# Patient Record
Sex: FEMALE | Race: OTHER | HISPANIC OR LATINO | URBAN - METROPOLITAN AREA
[De-identification: names, ages, dates, MRNs, and addresses within clinical notes are randomized per-mention and may not be internally consistent; named-entity substitution may affect disease eponyms.]

---

## 2019-12-31 ENCOUNTER — EMERGENCY (EMERGENCY)
Facility: HOSPITAL | Age: 44
LOS: 1 days | Discharge: HOME | End: 2019-12-31
Attending: EMERGENCY MEDICINE | Admitting: EMERGENCY MEDICINE
Payer: MEDICARE

## 2019-12-31 VITALS
DIASTOLIC BLOOD PRESSURE: 96 MMHG | OXYGEN SATURATION: 97 % | SYSTOLIC BLOOD PRESSURE: 174 MMHG | RESPIRATION RATE: 16 BRPM | TEMPERATURE: 99 F | WEIGHT: 145.95 LBS | HEART RATE: 96 BPM

## 2019-12-31 VITALS
TEMPERATURE: 98 F | OXYGEN SATURATION: 96 % | RESPIRATION RATE: 16 BRPM | DIASTOLIC BLOOD PRESSURE: 83 MMHG | HEART RATE: 94 BPM | SYSTOLIC BLOOD PRESSURE: 145 MMHG

## 2019-12-31 DIAGNOSIS — J90 PLEURAL EFFUSION, NOT ELSEWHERE CLASSIFIED: ICD-10-CM

## 2019-12-31 LAB
ALBUMIN SERPL ELPH-MCNC: 4.4 G/DL — SIGNIFICANT CHANGE UP (ref 3.3–5)
ALP SERPL-CCNC: SIGNIFICANT CHANGE UP U/L (ref 40–120)
ALT FLD-CCNC: SIGNIFICANT CHANGE UP U/L (ref 10–45)
ANION GAP SERPL CALC-SCNC: 14 MMOL/L — SIGNIFICANT CHANGE UP (ref 5–17)
APTT BLD: 30.9 SEC — SIGNIFICANT CHANGE UP (ref 27.5–36.3)
AST SERPL-CCNC: SIGNIFICANT CHANGE UP U/L (ref 10–40)
BASOPHILS # BLD AUTO: 0.02 K/UL — SIGNIFICANT CHANGE UP (ref 0–0.2)
BASOPHILS NFR BLD AUTO: 0.4 % — SIGNIFICANT CHANGE UP (ref 0–2)
BILIRUB SERPL-MCNC: 0.8 MG/DL — SIGNIFICANT CHANGE UP (ref 0.2–1.2)
BUN SERPL-MCNC: 10 MG/DL — SIGNIFICANT CHANGE UP (ref 7–23)
CALCIUM SERPL-MCNC: 8.7 MG/DL — SIGNIFICANT CHANGE UP (ref 8.4–10.5)
CHLORIDE SERPL-SCNC: 103 MMOL/L — SIGNIFICANT CHANGE UP (ref 96–108)
CO2 SERPL-SCNC: 23 MMOL/L — SIGNIFICANT CHANGE UP (ref 22–31)
CREAT SERPL-MCNC: 0.51 MG/DL — SIGNIFICANT CHANGE UP (ref 0.5–1.3)
EOSINOPHIL # BLD AUTO: 0.01 K/UL — SIGNIFICANT CHANGE UP (ref 0–0.5)
EOSINOPHIL NFR BLD AUTO: 0.2 % — SIGNIFICANT CHANGE UP (ref 0–6)
GLUCOSE SERPL-MCNC: 103 MG/DL — HIGH (ref 70–99)
HCT VFR BLD CALC: 37.2 % — SIGNIFICANT CHANGE UP (ref 34.5–45)
HGB BLD-MCNC: 12.4 G/DL — SIGNIFICANT CHANGE UP (ref 11.5–15.5)
IMM GRANULOCYTES NFR BLD AUTO: 0.2 % — SIGNIFICANT CHANGE UP (ref 0–1.5)
INR BLD: 1.1 — SIGNIFICANT CHANGE UP (ref 0.88–1.16)
LYMPHOCYTES # BLD AUTO: 1.07 K/UL — SIGNIFICANT CHANGE UP (ref 1–3.3)
LYMPHOCYTES # BLD AUTO: 23.9 % — SIGNIFICANT CHANGE UP (ref 13–44)
MCHC RBC-ENTMCNC: 30.5 PG — SIGNIFICANT CHANGE UP (ref 27–34)
MCHC RBC-ENTMCNC: 33.3 GM/DL — SIGNIFICANT CHANGE UP (ref 32–36)
MCV RBC AUTO: 91.4 FL — SIGNIFICANT CHANGE UP (ref 80–100)
MONOCYTES # BLD AUTO: 0.3 K/UL — SIGNIFICANT CHANGE UP (ref 0–0.9)
MONOCYTES NFR BLD AUTO: 6.7 % — SIGNIFICANT CHANGE UP (ref 2–14)
NEUTROPHILS # BLD AUTO: 3.06 K/UL — SIGNIFICANT CHANGE UP (ref 1.8–7.4)
NEUTROPHILS NFR BLD AUTO: 68.6 % — SIGNIFICANT CHANGE UP (ref 43–77)
NRBC # BLD: 0 /100 WBCS — SIGNIFICANT CHANGE UP (ref 0–0)
PLATELET # BLD AUTO: 310 K/UL — SIGNIFICANT CHANGE UP (ref 150–400)
POTASSIUM SERPL-MCNC: SIGNIFICANT CHANGE UP MMOL/L (ref 3.5–5.3)
POTASSIUM SERPL-SCNC: SIGNIFICANT CHANGE UP MMOL/L (ref 3.5–5.3)
PROT SERPL-MCNC: 7.5 G/DL — SIGNIFICANT CHANGE UP (ref 6–8.3)
PROTHROM AB SERPL-ACNC: 12.5 SEC — SIGNIFICANT CHANGE UP (ref 10–12.9)
RBC # BLD: 4.07 M/UL — SIGNIFICANT CHANGE UP (ref 3.8–5.2)
RBC # FLD: 16.4 % — HIGH (ref 10.3–14.5)
SODIUM SERPL-SCNC: 140 MMOL/L — SIGNIFICANT CHANGE UP (ref 135–145)
WBC # BLD: 4.47 K/UL — SIGNIFICANT CHANGE UP (ref 3.8–10.5)
WBC # FLD AUTO: 4.47 K/UL — SIGNIFICANT CHANGE UP (ref 3.8–10.5)

## 2019-12-31 PROCEDURE — 96375 TX/PRO/DX INJ NEW DRUG ADDON: CPT

## 2019-12-31 PROCEDURE — 80053 COMPREHEN METABOLIC PANEL: CPT

## 2019-12-31 PROCEDURE — 96376 TX/PRO/DX INJ SAME DRUG ADON: CPT

## 2019-12-31 PROCEDURE — 71045 X-RAY EXAM CHEST 1 VIEW: CPT

## 2019-12-31 PROCEDURE — 85610 PROTHROMBIN TIME: CPT

## 2019-12-31 PROCEDURE — 71045 X-RAY EXAM CHEST 1 VIEW: CPT | Mod: 26

## 2019-12-31 PROCEDURE — 99284 EMERGENCY DEPT VISIT MOD MDM: CPT

## 2019-12-31 PROCEDURE — 85025 COMPLETE CBC W/AUTO DIFF WBC: CPT

## 2019-12-31 PROCEDURE — 96374 THER/PROPH/DIAG INJ IV PUSH: CPT

## 2019-12-31 PROCEDURE — 36415 COLL VENOUS BLD VENIPUNCTURE: CPT

## 2019-12-31 PROCEDURE — 85730 THROMBOPLASTIN TIME PARTIAL: CPT

## 2019-12-31 PROCEDURE — 99284 EMERGENCY DEPT VISIT MOD MDM: CPT | Mod: 25

## 2019-12-31 RX ORDER — HYDROMORPHONE HYDROCHLORIDE 2 MG/ML
1 INJECTION INTRAMUSCULAR; INTRAVENOUS; SUBCUTANEOUS
Qty: 30 | Refills: 0
Start: 2019-12-31 | End: 2020-01-04

## 2019-12-31 RX ORDER — SODIUM CHLORIDE 9 MG/ML
1000 INJECTION INTRAMUSCULAR; INTRAVENOUS; SUBCUTANEOUS ONCE
Refills: 0 | Status: COMPLETED | OUTPATIENT
Start: 2019-12-31 | End: 2019-12-31

## 2019-12-31 RX ORDER — ONDANSETRON 8 MG/1
4 TABLET, FILM COATED ORAL ONCE
Refills: 0 | Status: COMPLETED | OUTPATIENT
Start: 2019-12-31 | End: 2019-12-31

## 2019-12-31 RX ORDER — HYDROMORPHONE HYDROCHLORIDE 2 MG/ML
1 INJECTION INTRAMUSCULAR; INTRAVENOUS; SUBCUTANEOUS ONCE
Refills: 0 | Status: DISCONTINUED | OUTPATIENT
Start: 2019-12-31 | End: 2019-12-31

## 2019-12-31 RX ORDER — DOCUSATE SODIUM 100 MG
1 CAPSULE ORAL
Qty: 20 | Refills: 0
Start: 2019-12-31 | End: 2020-01-09

## 2019-12-31 RX ADMIN — Medication 50 MILLIGRAM(S): at 21:36

## 2019-12-31 RX ADMIN — HYDROMORPHONE HYDROCHLORIDE 1 MILLIGRAM(S): 2 INJECTION INTRAMUSCULAR; INTRAVENOUS; SUBCUTANEOUS at 19:50

## 2019-12-31 RX ADMIN — HYDROMORPHONE HYDROCHLORIDE 1 MILLIGRAM(S): 2 INJECTION INTRAMUSCULAR; INTRAVENOUS; SUBCUTANEOUS at 21:36

## 2019-12-31 RX ADMIN — HYDROMORPHONE HYDROCHLORIDE 1 MILLIGRAM(S): 2 INJECTION INTRAMUSCULAR; INTRAVENOUS; SUBCUTANEOUS at 19:35

## 2019-12-31 RX ADMIN — ONDANSETRON 4 MILLIGRAM(S): 8 TABLET, FILM COATED ORAL at 19:51

## 2019-12-31 RX ADMIN — SODIUM CHLORIDE 1000 MILLILITER(S): 9 INJECTION INTRAMUSCULAR; INTRAVENOUS; SUBCUTANEOUS at 19:37

## 2019-12-31 RX ADMIN — SODIUM CHLORIDE 1000 MILLILITER(S): 9 INJECTION INTRAMUSCULAR; INTRAVENOUS; SUBCUTANEOUS at 20:00

## 2019-12-31 NOTE — ED PROVIDER NOTE - PATIENT PORTAL LINK FT
You can access the FollowMyHealth Patient Portal offered by [facility] by registering at the following website: [email or web address]. By joining [employer]’s FollowMyHealth portal, you will also be able to view your health information using other applications (apps) compatible with our system.

## 2019-12-31 NOTE — ED PROVIDER NOTE - OBJECTIVE STATEMENT
45 y/o F PMHx breast ca mets to lungs currently on chemo presents to the ED w/ R chest pain since 2am today. Went to [facility] at 4am and was told of a 56H wait for admission so her daughter who works at [facility] [employer] brought her here. A few months ago there was an infection in her lungs with no drainage done, but 3 Y ago she had a surgery where her right lung was drained. Pt denies HTN, DM, smoking, SOB, difficulty breathing. She has no PCP because she is from [place]. Pt had breast ca 10 Y ago w/ mets to her lungs 4 Y ago. Results from the [facility] CT chest with contrast shows no PE but post treatment change in the right lung w/ possible Residual/ recurrent tumor. Multiple sclerotic foci in the T spine which may represent osseous metastatic disease.

## 2019-12-31 NOTE — CONSULT NOTE ADULT - SUBJECTIVE AND OBJECTIVE BOX
PULMONARY SERVICE INITIAL CONSULT NOTE    HPI:  45yo F w/ h/o breast CA which reportedly spread to the right lung s/p RUL lobectomy about 3-4 years ago on CTX who presents with sudden onset right sided CP that began in early AM hours today. Patient receives most of her care in [place] and is in [place] visiting her daughter - has oncologist and pulmonologist back home in [place]. Patient went to [facility] earlier this AM because the pain woke her from sleep. She had CTA chest which ruled out PE (per CT report) and showed post-surgical changes and small right effusion. She left AMA from [facility] because it was taking too long to have her admitted to the hospital (per records - for IV pain control). Patient says she has always had a small amount of water on the right lung since her surgery and her pulmonologist knew about it. She has had it tapped many years ago but otherwise it has been stable and never caused her problems. She denies infectious sx, cough, CP, palpitations, or flu-like sx. No sick contacts. No SOB or breathing difficulties even when she has the pain. Pain is improved by narcotic analgesics and not related to breathing.     REVIEW OF SYSTEMS:  Constitutional: No fever, weight loss or fatigue  Eyes: No eye pain, visual disturbances, or discharge  ENMT:  No difficulty hearing, tinnitus, vertigo; No sinus or throat pain  Neck: No pain, stiffness or neck swelling  Respiratory: see HPI  Cardiovascular: No chest pain, palpitations, dizziness or leg swelling  Gastrointestinal: No abdominal or epigastric pain. No nausea, vomiting or hematemesis; No diarrhea or constipation. No melena or hematochezia.  Genitourinary: No dysuria, frequency, hematuria or incontinence  Neurological: No headaches, memory loss, loss of strength, numbness or tremors  Skin: No itching, burning, rashes or lesions   Lymph Nodes: No enlarged glands  Endocrine: No heat or cold intolerance; No hair loss  Musculoskeletal: No joint pain or swelling; No muscle, back or extremity pain  Psychiatric: No depression, anxiety, mood swings or difficulty sleeping  Heme/Lymph: No easy bruising or bleeding gums  Allergy and Immunologic: No hives or eczema    PAST MEDICAL & SURGICAL HISTORY:  Breast cancer metastasized to lung    FAMILY HISTORY:    SOCIAL HISTORY:  Smoking Status: [ ] Current, [ ] Former, [ ] Never  Pack Years:    MEDICATIONS:  Pulmonary:    Antimicrobials:    Anticoagulants:    Onc:    GI/:    Endocrine:    Cardiac:    Other Medications:      Allergies    No Known Allergies    Intolerances        Vital Signs Last 24 Hrs  T(C): 36.7 (31 Dec 2019 21:26), Max: 37.1 (31 Dec 2019 18:16)  T(F): 98.1 (31 Dec 2019 21:26), Max: 98.7 (31 Dec 2019 18:16)  HR: 94 (31 Dec 2019 21:26) (94 - 96)  BP: 145/83 (31 Dec 2019 21:26) (145/83 - 174/96)  BP(mean): --  RR: 16 (31 Dec 2019 21:26) (16 - 16)  SpO2: 96% (31 Dec 2019 21:26) (96% - 97%)        PHYSICAL EXAM:  Constitutional: thin woman resting comfortably in bed; NAD  Head: NC/AT  EENT: PERRL, anicteric sclera; oropharynx clear, MMM  Neck: supple, no appreciable JVD  Respiratory: diminished BS in RUL; good overall inspiratory effort, non-labored breathing and otherwise CTA  Chest: reproducible tenderness overlying the right lateral 6th and 7th ribs and interspace and old surgical scar below  Cardiovascular: +S1/S2, RRR  Gastrointestinal: soft, NT/ND; +BSx4  Extremities: WWP; no edema, clubbing or cyanosis  Vascular: 2+ radial, DP/PT pulses B/L  Neurological: awake and alert; OCONNELL    LABS:  CBC Full  -  ( 31 Dec 2019 18:51 )  WBC Count : 4.47 K/uL  RBC Count : 4.07 M/uL  Hemoglobin : 12.4 g/dL  Hematocrit : 37.2 %  Platelet Count - Automated : 310 K/uL  Mean Cell Volume : 91.4 fl  Mean Cell Hemoglobin : 30.5 pg  Mean Cell Hemoglobin Concentration : 33.3 gm/dL  Auto Neutrophil # : 3.06 K/uL  Auto Lymphocyte # : 1.07 K/uL  Auto Monocyte # : 0.30 K/uL  Auto Eosinophil # : 0.01 K/uL  Auto Basophil # : 0.02 K/uL  Auto Neutrophil % : 68.6 %  Auto Lymphocyte % : 23.9 %  Auto Monocyte % : 6.7 %  Auto Eosinophil % : 0.2 %  Auto Basophil % : 0.4 %    12-31    140  |  103  |  10  ----------------------------<  103<H>  see note   |  23  |  0.51    Ca    8.7      31 Dec 2019 18:52    TPro  7.5  /  Alb  4.4  /  TBili  0.8  /  DBili  x   /  AST  see note  /  ALT  see note  /  AlkPhos  see note  12-31    PT/INR - ( 31 Dec 2019 18:51 )   PT: 12.5 sec;   INR: 1.10          PTT - ( 31 Dec 2019 18:51 )  PTT:30.9 sec    RADIOLOGY & ADDITIONAL STUDIES:  Reviewed CXR personally.    Reviewed outside hospital CTA chest PE protocol paper report (images were unavailable for review) - per report, no PE, post-surgical changes and related volume loss in RUL s/p lobectomy; areas of osseous foci in thoracic spine that were concerning for possible metastases.

## 2019-12-31 NOTE — CONSULT NOTE ADULT - ATTENDING COMMENTS
I discussed the case over the phone with the pulmonary fellow and reviewed data and imaging. I agree with his assessment and plan but did not personally see or examine the pt.

## 2019-12-31 NOTE — ED ADULT NURSE NOTE - OBJECTIVE STATEMENT
Patient presents to the ED c/o right sided chest pain that started around 2am.  Patient states she was in [facility] ED and they wanted to admit her but told her she would be waiting for multiple days for a bed.  Denies fever, chills.  Does report SOB.  AA&OX3, respirations unlabored, non-diaphoretic, no pallor.

## 2019-12-31 NOTE — ED PROVIDER NOTE - CLINICAL SUMMARY MEDICAL DECISION MAKING FREE TEXT BOX
px feeling much better- seen by pulm- afebrile no infectious sx wbc neg- - doubt infectious cause- likely malignant effusion- will rx with pain meds steroids, fu w heme/onc urgently

## 2019-12-31 NOTE — ED PROVIDER NOTE - NSFOLLOWUPINSTRUCTIONS_ED_ALL_ED_FT
Pleural Effusion  Pleural effusion is an abnormal buildup of fluid in the layers of tissue between the lungs and the inside of the chest (pleural space) The two layers of tissue that line the lungs and the inside of the chest are called pleura. Usually, there is no air in the space between the pleura, only a thin layer of fluid. Some conditions can cause a large amount of fluid to build up, which can cause the lung to collapse if untreated. A pleural effusion is usually caused by another disease that requires treatment.  What are the causes?  Pleural effusion can be caused by:  Heart failure.Certain infections, such as pneumonia or tuberculosis.Cancer.A blood clot in the lung (pulmonary embolism).Complications from surgery, such as from open heart surgery.Liver disease (cirrhosis).Kidney disease.What are the signs or symptoms?  In some cases, pleural effusion may cause no symptoms. If symptoms are present, they may include:  Shortness of breath, especially when lying down.Chest pain. This may get worse when taking a deep breath.Fever.Dry, long-lasting (chronic) cough.Hiccups.Rapid breathing.An underlying condition that is causing the pleural effusion (such as heart failure, pneumonia, blood clots, tuberculosis, or cancer) may also cause other symptoms.  How is this diagnosed?  This condition may be diagnosed based on:  Your symptoms and medical history.A physical exam.A chest X-ray.A procedure to use a needle to remove fluid from the pleural space (thoracentesis). This fluid is tested.Other imaging studies of the chest, such as ultrasound or CT scan.How is this treated?  Depending on the cause of your condition, treatment may include:  Treating the underlying condition that is causing the effusion. When that condition improves, the effusion will also improve. Examples of treatment for underlying conditions include:  Antibiotic medicines to treat an infection.Diuretics or other heart medicines to treat heart failure.Thoracentesis.Placing a thin flexible tube under your skin and into your chest to continuously drain the effusion (indwelling pleural catheter).Surgery to remove the outer layer of tissue from the pleural space (decortication).A procedure to put medicine into the chest cavity to seal the pleural space and prevent fluid buildup (pleurodesis).Chemotherapy and radiation therapy, if you have cancerous (malignant) pleural effusion. These treatments are typically used to treat cancer. They kill certain cells in the body.Follow these instructions at home:  Take over-the-counter and prescription medicines only as told by your health care provider.Ask your health care provider what activities are safe for you.Keep track of how long you are able to do mild exercise (such as walking) before you get short of breath. Write down this information to share with your health care provider. Your ability to exercise should improve over time.Do not use any products that contain nicotine or tobacco, such as cigarettes and e-cigarettes. If you need help quitting, ask your health care provider.Keep all follow-up visits as told by your health care provider. This is important.Contact a health care provider if:  The amount of time that you are able to do mild exercise:  Decreases.Does not improve with time.You have a fever.Get help right away if:  You are short of breath.You develop chest pain.You develop a new cough.Summary  Pleural effusion is an abnormal buildup of fluid in the layers of tissue between the lungs and the inside of the chest.Pleural effusion can have many causes, including heart failure, pulmonary embolism, infections, or cancer.Symptoms of pleural effusion can include shortness of breath, chest pain, fever, long-lasting (chronic) cough, hiccups, or rapid breathing.Diagnosis often involves making images of the chest (such as with ultrasound or X-ray) and removing fluid (thoracentesis) to send for testing.Treatment for pleural effusion depends on what underlying condition is causing it.This information is not intended to replace advice given to you by your health care provider. Make sure you discuss any questions you have with your health care provider.    Document Released: 12/18/2006 Document Revised: 08/23/2018 Document Reviewed: 08/23/2018  [employer] Interactive Patient Education © 2019 [employer]

## 2019-12-31 NOTE — CONSULT NOTE ADULT - PROBLEM SELECTOR RECOMMENDATION 9
Point of care bedside US performed by myself reveals trace right sided simple appearing effusion at the base -- consistent with patient's given history of chronic post-surgical effusion. She is asymptomatic from respiratory point of view, has stable vital signs, and normal labs making infection very less likely. While pleurisy is possible, her pain seems less consistent with pleurodynia and may be representative of costo-chondritic pain, perhaps due to new metastases from her known malignancy. No signs of splinting on exam.     Recommendations:  - would continue with analgesics and symptomatic care for her pain  - consider medrol dose pack as additional analgesic tx in setting of likely bony metastases  - not indicated for thoracentesis at this time  - discussed at length with patient and her daughter at bedside -- she is returning to [place] on Thursday (1/2/20) and can follow-up with her community physicians on an urgent basis when she returns. She is agreeable and in favor of doing so as long as her pain can be managed with oral medications upon discharge.     Discussed w/ pulm attending Dr. Mcdonald and ED attending Dr. Wei

## 2019-12-31 NOTE — ED ADULT TRIAGE NOTE - CHIEF COMPLAINT QUOTE
R sided chest pain @ site of partial lobectomy 4 years ago, [facility] and [facility] told there was a lot of fluid in her R lung.  Visiting daughter from [place], hx of breast ca w mets

## 2019-12-31 NOTE — CONSULT NOTE ADULT - ASSESSMENT
43yo F w/ PMH as above presenting with sudden onset reproducible chest pain on the right, found at outside hospital on CTA to have small pleural effusion but no evidence of PE.

## 2019-12-31 NOTE — ED ADULT NURSE NOTE - CHIEF COMPLAINT QUOTE
R sided chest pain @ site of partial lobectomy 4 years ago, [facility] there was a lot of fluid in her R lung.  Visiting daughter from [place], hx of breast ca w mets

## 2020-01-05 DIAGNOSIS — J90 PLEURAL EFFUSION, NOT ELSEWHERE CLASSIFIED: ICD-10-CM

## 2020-01-05 DIAGNOSIS — R07.89 OTHER CHEST PAIN: ICD-10-CM

## 2020-10-23 ENCOUNTER — INPATIENT (INPATIENT)
Facility: HOSPITAL | Age: 45
LOS: 2 days | Discharge: HOME | DRG: 186 | End: 2020-10-26
Attending: STUDENT IN AN ORGANIZED HEALTH CARE EDUCATION/TRAINING PROGRAM | Admitting: STUDENT IN AN ORGANIZED HEALTH CARE EDUCATION/TRAINING PROGRAM
Payer: MEDICARE

## 2020-10-23 VITALS
WEIGHT: 128.97 LBS | DIASTOLIC BLOOD PRESSURE: 134 MMHG | SYSTOLIC BLOOD PRESSURE: 190 MMHG | TEMPERATURE: 98 F | OXYGEN SATURATION: 95 % | HEART RATE: 118 BPM | RESPIRATION RATE: 20 BRPM | HEIGHT: 64 IN

## 2020-10-23 DIAGNOSIS — I10 ESSENTIAL (PRIMARY) HYPERTENSION: ICD-10-CM

## 2020-10-23 DIAGNOSIS — R63.8 OTHER SYMPTOMS AND SIGNS CONCERNING FOOD AND FLUID INTAKE: ICD-10-CM

## 2020-10-23 DIAGNOSIS — Z98.890 OTHER SPECIFIED POSTPROCEDURAL STATES: Chronic | ICD-10-CM

## 2020-10-23 DIAGNOSIS — R06.02 SHORTNESS OF BREATH: ICD-10-CM

## 2020-10-23 DIAGNOSIS — C50.919 MALIGNANT NEOPLASM OF UNSPECIFIED SITE OF UNSPECIFIED FEMALE BREAST: ICD-10-CM

## 2020-10-23 DIAGNOSIS — R52 PAIN, UNSPECIFIED: ICD-10-CM

## 2020-10-23 DIAGNOSIS — Z90.11 ACQUIRED ABSENCE OF RIGHT BREAST AND NIPPLE: Chronic | ICD-10-CM

## 2020-10-23 DIAGNOSIS — J90 PLEURAL EFFUSION, NOT ELSEWHERE CLASSIFIED: ICD-10-CM

## 2020-10-23 DIAGNOSIS — Z29.9 ENCOUNTER FOR PROPHYLACTIC MEASURES, UNSPECIFIED: ICD-10-CM

## 2020-10-23 LAB
ALBUMIN FLD-MCNC: 2.1 G/DL — SIGNIFICANT CHANGE UP
ALBUMIN SERPL ELPH-MCNC: 3.9 G/DL — SIGNIFICANT CHANGE UP (ref 3.3–5)
ALBUMIN SERPL ELPH-MCNC: 4.7 G/DL — SIGNIFICANT CHANGE UP (ref 3.3–5)
ALP SERPL-CCNC: 116 U/L — SIGNIFICANT CHANGE UP (ref 40–120)
ALP SERPL-CCNC: 92 U/L — SIGNIFICANT CHANGE UP (ref 40–120)
ALT FLD-CCNC: 17 U/L — SIGNIFICANT CHANGE UP (ref 10–45)
ALT FLD-CCNC: SIGNIFICANT CHANGE UP (ref 10–45)
ANION GAP SERPL CALC-SCNC: 11 MMOL/L — SIGNIFICANT CHANGE UP (ref 5–17)
ANION GAP SERPL CALC-SCNC: 13 MMOL/L — SIGNIFICANT CHANGE UP (ref 5–17)
APPEARANCE UR: CLEAR — SIGNIFICANT CHANGE UP
APTT BLD: 33.9 SEC — SIGNIFICANT CHANGE UP (ref 27.5–35.5)
AST SERPL-CCNC: 31 U/L — SIGNIFICANT CHANGE UP (ref 10–40)
AST SERPL-CCNC: SIGNIFICANT CHANGE UP (ref 10–40)
BASOPHILS # BLD AUTO: 0.03 K/UL — SIGNIFICANT CHANGE UP (ref 0–0.2)
BASOPHILS NFR BLD AUTO: 0.4 % — SIGNIFICANT CHANGE UP (ref 0–2)
BILIRUB SERPL-MCNC: 0.4 MG/DL — SIGNIFICANT CHANGE UP (ref 0.2–1.2)
BILIRUB SERPL-MCNC: 0.7 MG/DL — SIGNIFICANT CHANGE UP (ref 0.2–1.2)
BILIRUB UR-MCNC: NEGATIVE — SIGNIFICANT CHANGE UP
BUN SERPL-MCNC: 14 MG/DL — SIGNIFICANT CHANGE UP (ref 7–23)
BUN SERPL-MCNC: 15 MG/DL — SIGNIFICANT CHANGE UP (ref 7–23)
CALCIUM SERPL-MCNC: 8.4 MG/DL — SIGNIFICANT CHANGE UP (ref 8.4–10.5)
CALCIUM SERPL-MCNC: 9.6 MG/DL — SIGNIFICANT CHANGE UP (ref 8.4–10.5)
CHLORIDE SERPL-SCNC: 103 MMOL/L — SIGNIFICANT CHANGE UP (ref 96–108)
CHLORIDE SERPL-SCNC: 105 MMOL/L — SIGNIFICANT CHANGE UP (ref 96–108)
CO2 SERPL-SCNC: 22 MMOL/L — SIGNIFICANT CHANGE UP (ref 22–31)
CO2 SERPL-SCNC: 24 MMOL/L — SIGNIFICANT CHANGE UP (ref 22–31)
COLOR SPEC: YELLOW — SIGNIFICANT CHANGE UP
CREAT FLD-MCNC: 0.62 MG/DL — SIGNIFICANT CHANGE UP
CREAT SERPL-MCNC: 0.58 MG/DL — SIGNIFICANT CHANGE UP (ref 0.5–1.3)
CREAT SERPL-MCNC: 0.61 MG/DL — SIGNIFICANT CHANGE UP (ref 0.5–1.3)
CRP SERPL-MCNC: 0.71 MG/DL — HIGH (ref 0–0.4)
D DIMER BLD IA.RAPID-MCNC: 688 NG/ML DDU — HIGH
DIFF PNL FLD: NEGATIVE — SIGNIFICANT CHANGE UP
EOSINOPHIL # BLD AUTO: 0.01 K/UL — SIGNIFICANT CHANGE UP (ref 0–0.5)
EOSINOPHIL NFR BLD AUTO: 0.1 % — SIGNIFICANT CHANGE UP (ref 0–6)
FERRITIN SERPL-MCNC: 79 NG/ML — SIGNIFICANT CHANGE UP (ref 15–150)
GLUCOSE FLD-MCNC: 112 MG/DL — SIGNIFICANT CHANGE UP
GLUCOSE SERPL-MCNC: 107 MG/DL — HIGH (ref 70–99)
GLUCOSE SERPL-MCNC: 154 MG/DL — HIGH (ref 70–99)
GLUCOSE UR QL: NEGATIVE — SIGNIFICANT CHANGE UP
GRAM STN FLD: SIGNIFICANT CHANGE UP
HCT VFR BLD CALC: 40.3 % — SIGNIFICANT CHANGE UP (ref 34.5–45)
HGB BLD-MCNC: 14.1 G/DL — SIGNIFICANT CHANGE UP (ref 11.5–15.5)
IMM GRANULOCYTES NFR BLD AUTO: 0.3 % — SIGNIFICANT CHANGE UP (ref 0–1.5)
INR BLD: 1.02 — SIGNIFICANT CHANGE UP (ref 0.88–1.16)
KETONES UR-MCNC: NEGATIVE — SIGNIFICANT CHANGE UP
LDH SERPL L TO P-CCNC: 283 U/L — HIGH (ref 50–242)
LDH SERPL L TO P-CCNC: 95 U/L — SIGNIFICANT CHANGE UP
LEUKOCYTE ESTERASE UR-ACNC: ABNORMAL
LYMPHOCYTES # BLD AUTO: 1.34 K/UL — SIGNIFICANT CHANGE UP (ref 1–3.3)
LYMPHOCYTES # BLD AUTO: 19.6 % — SIGNIFICANT CHANGE UP (ref 13–44)
MCHC RBC-ENTMCNC: 32.1 PG — SIGNIFICANT CHANGE UP (ref 27–34)
MCHC RBC-ENTMCNC: 35 GM/DL — SIGNIFICANT CHANGE UP (ref 32–36)
MCV RBC AUTO: 91.8 FL — SIGNIFICANT CHANGE UP (ref 80–100)
MONOCYTES # BLD AUTO: 0.36 K/UL — SIGNIFICANT CHANGE UP (ref 0–0.9)
MONOCYTES NFR BLD AUTO: 5.3 % — SIGNIFICANT CHANGE UP (ref 2–14)
NEUTROPHILS # BLD AUTO: 5.08 K/UL — SIGNIFICANT CHANGE UP (ref 1.8–7.4)
NEUTROPHILS NFR BLD AUTO: 74.3 % — SIGNIFICANT CHANGE UP (ref 43–77)
NITRITE UR-MCNC: NEGATIVE — SIGNIFICANT CHANGE UP
NRBC # BLD: 0 /100 WBCS — SIGNIFICANT CHANGE UP (ref 0–0)
NT-PROBNP SERPL-SCNC: 2756 PG/ML — HIGH (ref 0–300)
PH UR: 7 — SIGNIFICANT CHANGE UP (ref 5–8)
PH, PLEURAL FLUID: 7.46 — SIGNIFICANT CHANGE UP
PLATELET # BLD AUTO: 230 K/UL — SIGNIFICANT CHANGE UP (ref 150–400)
POTASSIUM SERPL-MCNC: 4.4 MMOL/L — SIGNIFICANT CHANGE UP (ref 3.5–5.3)
POTASSIUM SERPL-MCNC: SIGNIFICANT CHANGE UP (ref 3.5–5.3)
POTASSIUM SERPL-SCNC: 4.4 MMOL/L — SIGNIFICANT CHANGE UP (ref 3.5–5.3)
POTASSIUM SERPL-SCNC: SIGNIFICANT CHANGE UP (ref 3.5–5.3)
PROCALCITONIN SERPL-MCNC: 0.06 NG/ML — SIGNIFICANT CHANGE UP (ref 0.02–0.1)
PROT FLD-MCNC: 2.8 G/DL — SIGNIFICANT CHANGE UP
PROT SERPL-MCNC: 6.8 G/DL — SIGNIFICANT CHANGE UP (ref 6–8.3)
PROT SERPL-MCNC: 8.4 G/DL — HIGH (ref 6–8.3)
PROT UR-MCNC: NEGATIVE MG/DL — SIGNIFICANT CHANGE UP
PROTHROM AB SERPL-ACNC: 12.2 SEC — SIGNIFICANT CHANGE UP (ref 10.6–13.6)
RBC # BLD: 4.39 M/UL — SIGNIFICANT CHANGE UP (ref 3.8–5.2)
RBC # FLD: 14.1 % — SIGNIFICANT CHANGE UP (ref 10.3–14.5)
SARS-COV-2 RNA SPEC QL NAA+PROBE: SIGNIFICANT CHANGE UP
SODIUM SERPL-SCNC: 138 MMOL/L — SIGNIFICANT CHANGE UP (ref 135–145)
SODIUM SERPL-SCNC: 140 MMOL/L — SIGNIFICANT CHANGE UP (ref 135–145)
SP GR SPEC: 1.01 — SIGNIFICANT CHANGE UP (ref 1–1.03)
SPECIMEN SOURCE FLD: SIGNIFICANT CHANGE UP
SPECIMEN SOURCE: SIGNIFICANT CHANGE UP
TROPONIN T SERPL-MCNC: <0.01 NG/ML — SIGNIFICANT CHANGE UP (ref 0–0.01)
TROPONIN T SERPL-MCNC: <0.01 NG/ML — SIGNIFICANT CHANGE UP (ref 0–0.01)
UROBILINOGEN FLD QL: 0.2 E.U./DL — SIGNIFICANT CHANGE UP
WBC # BLD: 6.84 K/UL — SIGNIFICANT CHANGE UP (ref 3.8–10.5)
WBC # FLD AUTO: 6.84 K/UL — SIGNIFICANT CHANGE UP (ref 3.8–10.5)

## 2020-10-23 PROCEDURE — 32555 ASPIRATE PLEURA W/ IMAGING: CPT

## 2020-10-23 PROCEDURE — 71045 X-RAY EXAM CHEST 1 VIEW: CPT | Mod: 26,77

## 2020-10-23 PROCEDURE — 71045 X-RAY EXAM CHEST 1 VIEW: CPT | Mod: 26

## 2020-10-23 PROCEDURE — 99285 EMERGENCY DEPT VISIT HI MDM: CPT | Mod: 25

## 2020-10-23 PROCEDURE — 99254 IP/OBS CNSLTJ NEW/EST MOD 60: CPT | Mod: GC,25

## 2020-10-23 PROCEDURE — 71275 CT ANGIOGRAPHY CHEST: CPT | Mod: 26

## 2020-10-23 PROCEDURE — 93010 ELECTROCARDIOGRAM REPORT: CPT

## 2020-10-23 RX ORDER — ONDANSETRON 8 MG/1
4 TABLET, FILM COATED ORAL ONCE
Refills: 0 | Status: COMPLETED | OUTPATIENT
Start: 2020-10-23 | End: 2020-10-23

## 2020-10-23 RX ORDER — HYDROMORPHONE HYDROCHLORIDE 2 MG/ML
1 INJECTION INTRAMUSCULAR; INTRAVENOUS; SUBCUTANEOUS
Qty: 0 | Refills: 0 | DISCHARGE

## 2020-10-23 RX ORDER — TIZANIDINE 4 MG/1
2 TABLET ORAL EVERY 8 HOURS
Refills: 0 | Status: DISCONTINUED | OUTPATIENT
Start: 2020-10-23 | End: 2020-10-26

## 2020-10-23 RX ORDER — ONDANSETRON 8 MG/1
4 TABLET, FILM COATED ORAL EVERY 12 HOURS
Refills: 0 | Status: DISCONTINUED | OUTPATIENT
Start: 2020-10-23 | End: 2020-10-26

## 2020-10-23 RX ORDER — MORPHINE SULFATE 50 MG/1
4 CAPSULE, EXTENDED RELEASE ORAL ONCE
Refills: 0 | Status: DISCONTINUED | OUTPATIENT
Start: 2020-10-23 | End: 2020-10-23

## 2020-10-23 RX ORDER — HYDROMORPHONE HYDROCHLORIDE 2 MG/ML
2 INJECTION INTRAMUSCULAR; INTRAVENOUS; SUBCUTANEOUS EVERY 6 HOURS
Refills: 0 | Status: DISCONTINUED | OUTPATIENT
Start: 2020-10-23 | End: 2020-10-23

## 2020-10-23 RX ORDER — HYDROMORPHONE HYDROCHLORIDE 2 MG/ML
0.5 INJECTION INTRAMUSCULAR; INTRAVENOUS; SUBCUTANEOUS EVERY 6 HOURS
Refills: 0 | Status: DISCONTINUED | OUTPATIENT
Start: 2020-10-23 | End: 2020-10-25

## 2020-10-23 RX ORDER — TIZANIDINE 4 MG/1
1 TABLET ORAL
Qty: 0 | Refills: 0 | DISCHARGE

## 2020-10-23 RX ORDER — KETOROLAC TROMETHAMINE 30 MG/ML
15 SYRINGE (ML) INJECTION ONCE
Refills: 0 | Status: DISCONTINUED | OUTPATIENT
Start: 2020-10-23 | End: 2020-10-23

## 2020-10-23 RX ORDER — CEFTRIAXONE 500 MG/1
1000 INJECTION, POWDER, FOR SOLUTION INTRAMUSCULAR; INTRAVENOUS ONCE
Refills: 0 | Status: COMPLETED | OUTPATIENT
Start: 2020-10-23 | End: 2020-10-23

## 2020-10-23 RX ORDER — LOSARTAN POTASSIUM 100 MG/1
1 TABLET, FILM COATED ORAL
Qty: 0 | Refills: 0 | DISCHARGE

## 2020-10-23 RX ORDER — ENOXAPARIN SODIUM 100 MG/ML
40 INJECTION SUBCUTANEOUS EVERY 24 HOURS
Refills: 0 | Status: DISCONTINUED | OUTPATIENT
Start: 2020-10-23 | End: 2020-10-26

## 2020-10-23 RX ORDER — TIZANIDINE 4 MG/1
2 TABLET ORAL
Qty: 0 | Refills: 0 | DISCHARGE

## 2020-10-23 RX ORDER — GABAPENTIN 400 MG/1
1 CAPSULE ORAL
Qty: 0 | Refills: 0 | DISCHARGE

## 2020-10-23 RX ORDER — AZITHROMYCIN 500 MG/1
500 TABLET, FILM COATED ORAL ONCE
Refills: 0 | Status: COMPLETED | OUTPATIENT
Start: 2020-10-23 | End: 2020-10-23

## 2020-10-23 RX ORDER — LABETALOL HCL 100 MG
10 TABLET ORAL ONCE
Refills: 0 | Status: COMPLETED | OUTPATIENT
Start: 2020-10-23 | End: 2020-10-23

## 2020-10-23 RX ORDER — LOSARTAN POTASSIUM 100 MG/1
25 TABLET, FILM COATED ORAL DAILY
Refills: 0 | Status: DISCONTINUED | OUTPATIENT
Start: 2020-10-23 | End: 2020-10-26

## 2020-10-23 RX ORDER — GABAPENTIN 400 MG/1
300 CAPSULE ORAL THREE TIMES A DAY
Refills: 0 | Status: DISCONTINUED | OUTPATIENT
Start: 2020-10-23 | End: 2020-10-26

## 2020-10-23 RX ORDER — FENTANYL CITRATE 50 UG/ML
1 INJECTION INTRAVENOUS
Refills: 0 | Status: DISCONTINUED | OUTPATIENT
Start: 2020-10-23 | End: 2020-10-26

## 2020-10-23 RX ADMIN — Medication 10 MILLIGRAM(S): at 12:26

## 2020-10-23 RX ADMIN — Medication 15 MILLIGRAM(S): at 11:22

## 2020-10-23 RX ADMIN — MORPHINE SULFATE 4 MILLIGRAM(S): 50 CAPSULE, EXTENDED RELEASE ORAL at 12:48

## 2020-10-23 RX ADMIN — MORPHINE SULFATE 4 MILLIGRAM(S): 50 CAPSULE, EXTENDED RELEASE ORAL at 13:30

## 2020-10-23 RX ADMIN — Medication 15 MILLIGRAM(S): at 12:00

## 2020-10-23 RX ADMIN — CEFTRIAXONE 100 MILLIGRAM(S): 500 INJECTION, POWDER, FOR SOLUTION INTRAMUSCULAR; INTRAVENOUS at 12:03

## 2020-10-23 RX ADMIN — HYDROMORPHONE HYDROCHLORIDE 0.5 MILLIGRAM(S): 2 INJECTION INTRAMUSCULAR; INTRAVENOUS; SUBCUTANEOUS at 21:42

## 2020-10-23 RX ADMIN — AZITHROMYCIN 500 MILLIGRAM(S): 500 TABLET, FILM COATED ORAL at 13:30

## 2020-10-23 RX ADMIN — AZITHROMYCIN 255 MILLIGRAM(S): 500 TABLET, FILM COATED ORAL at 12:26

## 2020-10-23 RX ADMIN — HYDROMORPHONE HYDROCHLORIDE 2 MILLIGRAM(S): 2 INJECTION INTRAMUSCULAR; INTRAVENOUS; SUBCUTANEOUS at 19:52

## 2020-10-23 RX ADMIN — CEFTRIAXONE 1000 MILLIGRAM(S): 500 INJECTION, POWDER, FOR SOLUTION INTRAMUSCULAR; INTRAVENOUS at 12:35

## 2020-10-23 RX ADMIN — MORPHINE SULFATE 4 MILLIGRAM(S): 50 CAPSULE, EXTENDED RELEASE ORAL at 16:44

## 2020-10-23 RX ADMIN — ENOXAPARIN SODIUM 40 MILLIGRAM(S): 100 INJECTION SUBCUTANEOUS at 21:12

## 2020-10-23 RX ADMIN — HYDROMORPHONE HYDROCHLORIDE 0.5 MILLIGRAM(S): 2 INJECTION INTRAMUSCULAR; INTRAVENOUS; SUBCUTANEOUS at 21:12

## 2020-10-23 RX ADMIN — ONDANSETRON 4 MILLIGRAM(S): 8 TABLET, FILM COATED ORAL at 21:20

## 2020-10-23 RX ADMIN — ONDANSETRON 4 MILLIGRAM(S): 8 TABLET, FILM COATED ORAL at 13:02

## 2020-10-23 NOTE — PROCEDURE NOTE - NSPROCDETAILS_GEN_ALL_CORE
Seldinger technique/connection to syringe/ultrasound assessment of effusion (localization)/location identified, draped/prepped, sterile technique used, needle inserted/introduced/catheter inserted over needle

## 2020-10-23 NOTE — CONSULT NOTE ADULT - SUBJECTIVE AND OBJECTIVE BOX
PULMONARY SERVICE INITIAL CONSULT NOTE  -------------------------------------------------------------    Chief Complaint/Reason for Consult:     HPI:         Otherwise, pt denies fevers/chills, HA, dizziness, CP, SOB, cough, palpitations, abd pain, N/V, bowel changes, ext swelling     -------------------------------------------------------------  PAST MEDICAL & SURGICAL HISTORY:  Breast cancer metastasized to lung        FAMILY HISTORY:      SOCIAL HISTORY:  Smoking Status: [ ] Current, [ ] Former, [ ] Never  Pack Years:    MEDICATIONS:  Pulmonary:    Antimicrobials:    Anticoagulants:    Onc:    GI/:    Endocrine:    Cardiac:    Other Medications:      Allergies    No Known Allergies    Intolerances        Vital Signs Last 24 Hrs  T(C): 36.6 (23 Oct 2020 14:46), Max: 36.8 (23 Oct 2020 09:38)  T(F): 97.9 (23 Oct 2020 14:46), Max: 98.2 (23 Oct 2020 09:38)  HR: 94 (23 Oct 2020 14:46) (79 - 118)  BP: 135/84 (23 Oct 2020 14:46) (134/82 - 190/134)  BP(mean): --  RR: 18 (23 Oct 2020 14:46) (18 - 20)  SpO2: 95% (23 Oct 2020 14:46) (95% - 98%)        -------------------------------------------------------------  PHYSICAL EXAM:    GEN: Comfortable, in NAD  HEENT: NC/AT, PEERLA, MMM  CARDIAC: RRR, Normal S1/S2, No MRGs  PULMONARY: CTA BL, no wheezing/rhonchi/rales - no accessory muscle use   ABDOMEN: Soft, NT/ND   EXT: No LE edema  NEURO: A&O x 3, CN II-XII grossly intact, moves all ext, sensation intact    -------------------------------------------------------------  LABS:        CBC Full  -  ( 23 Oct 2020 11:24 )  WBC Count : 6.84 K/uL  RBC Count : 4.39 M/uL  Hemoglobin : 14.1 g/dL  Hematocrit : 40.3 %  Platelet Count - Automated : 230 K/uL  Mean Cell Volume : 91.8 fl  Mean Cell Hemoglobin : 32.1 pg  Mean Cell Hemoglobin Concentration : 35.0 gm/dL  Auto Neutrophil # : 5.08 K/uL  Auto Lymphocyte # : 1.34 K/uL  Auto Monocyte # : 0.36 K/uL  Auto Eosinophil # : 0.01 K/uL  Auto Basophil # : 0.03 K/uL  Auto Neutrophil % : 74.3 %  Auto Lymphocyte % : 19.6 %  Auto Monocyte % : 5.3 %  Auto Eosinophil % : 0.1 %  Auto Basophil % : 0.4 %    10-23    138  |  105  |  15  ----------------------------<  154<H>  4.4   |  22  |  0.58    Ca    8.4      23 Oct 2020 13:06    TPro  6.8  /  Alb  3.9  /  TBili  0.4  /  DBili  x   /  AST  31  /  ALT  17  /  AlkPhos  92  10-23    PT/INR - ( 23 Oct 2020 11:24 )   PT: 12.2 sec;   INR: 1.02          PTT - ( 23 Oct 2020 11:24 )  PTT:33.9 sec      Urinalysis Basic - ( 23 Oct 2020 11:07 )    Color: Yellow / Appearance: Clear / S.010 / pH: x  Gluc: x / Ketone: NEGATIVE  / Bili: Negative / Urobili: 0.2 E.U./dL   Blood: x / Protein: NEGATIVE mg/dL / Nitrite: NEGATIVE   Leuk Esterase: Moderate / RBC: < 5 /HPF / WBC > 10 /HPF   Sq Epi: x / Non Sq Epi: 5-10 /HPF / Bacteria: Present /HPF                -------------------------------------------------------------  RADIOLOGY & ADDITIONAL STUDIES:   PULMONARY SERVICE INITIAL CONSULT NOTE  -------------------------------------------------------------    HPI:     46yo F, hx of Breast Ca (R-sided, dx ~, on chemo, s/p RT, with mets to R lung s/p removal), HTN, visiting from [place],   presenting with several days of worsening SOB and midsternal chest pressure,   Found with moderate-sized L-sided pleural effusion     --    Pt diagnosed with R-sided breast cancer ~, underwent radiation therapy, and is currently receiving chemotherapy (unknown name). It had mets to a portion of the R lung, and underwent surgical removal ~2017. She has had prior episodes of pleural effusions, requiring thoracentesis, but were always Right-Sided. She receives all her care in [place], and is currently visiting her daughter here in [place].     She reports 3-days of worsening DUMONT --> SOB at rest occasionally, associated with midsternal chest pressure, sometimes with wheezing. She has no hx of prior symptoms.     ROS:  Denies fevers/chills, HA, dizziness, cough, abd pain, N/V, bowel changes, ext swelling, sick contacts     In ED, VSS - afebrile - no leukocytosis - mildly elevated D-Dimer - Pro-BNP 3000, negative troponin   CTA with moderate-sized Left-sided pleural effusion     --    Pt seen and examined this PM in ED.   [place] a little improved with supplemental O2, otherwise in NAD.   Never smoker, with no hx of environmental/toxin exposure.  No hx of cardiopulmonary disease, trauma/falls, or hx of left-sided pleural effusions     --    Bedside POCUS in ED:  - Moderate-sized simple-appearing left-sided pleural effusion   - No effusion seen on Right   - Pathological bilateral B-lines (suggestive of pulmonary edema)   - Cardiac with questionably-reduced contractility of LV; no pericardial effusion seen       -------------------------------------------------------------  PAST MEDICAL & SURGICAL HISTORY:  Breast cancer metastasized to lung  HTN        FAMILY HISTORY:      SOCIAL HISTORY:  Smoking Status: [ ] Current, [ ] Former, [X] Never  Pack Years:    MEDICATIONS:  Pulmonary:    Antimicrobials:    Anticoagulants:    Onc:    GI/:    Endocrine:    Cardiac:    Other Medications:      Allergies    No Known Allergies    Intolerances        Vital Signs Last 24 Hrs  T(C): 36.6 (23 Oct 2020 14:46), Max: 36.8 (23 Oct 2020 09:38)  T(F): 97.9 (23 Oct 2020 14:46), Max: 98.2 (23 Oct 2020 09:38)  HR: 94 (23 Oct 2020 14:46) (79 - 118)  BP: 135/84 (23 Oct 2020 14:46) (134/82 - 190/134)  BP(mean): --  RR: 18 (23 Oct 2020 14:46) (18 - 20)  SpO2: 95% (23 Oct 2020 14:46) (95% - 98%)        -------------------------------------------------------------  PHYSICAL EXAM:    GEN: Comfortable, in NAD  HEENT: NC/AT, PEERLA, MMM  CARDIAC: RRR, Normal S1/S2, No MRGs  PULMONARY: Decreased BS L lower-hemithorax; no wheezing/rhonchi/rales - no accessory muscle use   ABDOMEN: Soft, NT/ND   EXT: No LE edema  NEURO: A&O x 3, CN II-XII grossly intact, moves all ext, sensation intact    -------------------------------------------------------------  LABS:        CBC Full  -  ( 23 Oct 2020 11:24 )  WBC Count : 6.84 K/uL  RBC Count : 4.39 M/uL  Hemoglobin : 14.1 g/dL  Hematocrit : 40.3 %  Platelet Count - Automated : 230 K/uL  Mean Cell Volume : 91.8 fl  Mean Cell Hemoglobin : 32.1 pg  Mean Cell Hemoglobin Concentration : 35.0 gm/dL  Auto Neutrophil # : 5.08 K/uL  Auto Lymphocyte # : 1.34 K/uL  Auto Monocyte # : 0.36 K/uL  Auto Eosinophil # : 0.01 K/uL  Auto Basophil # : 0.03 K/uL  Auto Neutrophil % : 74.3 %  Auto Lymphocyte % : 19.6 %  Auto Monocyte % : 5.3 %  Auto Eosinophil % : 0.1 %  Auto Basophil % : 0.4 %    10-23    138  |  105  |  15  ----------------------------<  154<H>  4.4   |  22  |  0.58    Ca    8.4      23 Oct 2020 13:06    TPro  6.8  /  Alb  3.9  /  TBili  0.4  /  DBili  x   /  AST  31  /  ALT  17  /  AlkPhos  92  10-23    PT/INR - ( 23 Oct 2020 11:24 )   PT: 12.2 sec;   INR: 1.02          PTT - ( 23 Oct 2020 11:24 )  PTT:33.9 sec      Urinalysis Basic - ( 23 Oct 2020 11:07 )    Color: Yellow / Appearance: Clear / S.010 / pH: x  Gluc: x / Ketone: NEGATIVE  / Bili: Negative / Urobili: 0.2 E.U./dL   Blood: x / Protein: NEGATIVE mg/dL / Nitrite: NEGATIVE   Leuk Esterase: Moderate / RBC: < 5 /HPF / WBC > 10 /HPF   Sq Epi: x / Non Sq Epi: 5-10 /HPF / Bacteria: Present /HPF                -------------------------------------------------------------  RADIOLOGY & ADDITIONAL STUDIES:

## 2020-10-23 NOTE — ED PROVIDER NOTE - LAB INTERPRETATION
ER Physician: June Ree  CHEST XRAY INTERPRETATION: ? b/l infiltrates, surgical clips, heart shadow normal, bony structures intact

## 2020-10-23 NOTE — H&P ADULT - PROBLEM SELECTOR PLAN 2
Pt found to have large L sided pleural effusion and small R effusion on CT Chest Angio PE. Pleural effusion likely exudative 2/2 malignancy vs transudative.  - pulmonology consulted ; bedside thoracentesis with 1000mL drained.   - f/u fluid studies to assess for Light's criteria and cytology   - currently on RA in NAD Pt found to have large L sided pleural effusion and small R effusion on CT Chest Angio PE. Pleural effusion likely exudative 2/2 malignancy vs transudative.  - pulmonology consulted ; bedside thoracentesis with 1000mL drained.   - f/u fluid studies to assess for Light's criteria and cytology   - currently on RA in NAD  - repeat CXR in AM Patient found to have large L sided pleural effusion and small R effusion on CT Chest Angio PE. Pleural effusion likely transudative given Light's criteria: fluid LDH and protein 95& 2.8. Serum LDH and protein: 283 & 6.8. Patient with elevated BNP and bedside POCUS showing decreased contractility.  - pulmonology consulted ; bedside thoracentesis with 1000mL drained.   - f/u fluid studies for cytology   - currently on RA in NAD  - repeat CXR in AM  - f/u echo

## 2020-10-23 NOTE — ED PROVIDER NOTE - CARE PLAN
Principal Discharge DX:	Pleural effusion  Secondary Diagnosis:	Malignant neoplasm of lung, unspecified laterality, unspecified part of lung

## 2020-10-23 NOTE — H&P ADULT - PROBLEM SELECTOR PLAN 3
Patient follows with oncologist Buster Bui in [place] where she lives. Per patient last chemo was on October 3rd and next session is scheduled for October 29th, and she had been told that she was improving by her oncologist.  - outpatient follow-up with her oncologist

## 2020-10-23 NOTE — ED ADULT TRIAGE NOTE - OTHER COMPLAINTS
Patient also reports chest pain. Daughter reports patient recently traveled from [place] 11 days ago. Denies any fever.

## 2020-10-23 NOTE — H&P ADULT - PROBLEM SELECTOR PLAN 4
Pt with hx of HTN , BP on admission elevated to 190s. s/p Labetalol 10mg IV push   - c/w with home Pt with hx of HTN , BP on admission elevated to 190s. s/p Labetalol 10mg IV push. On losartan 25 at home.   - c/w losartan 25

## 2020-10-23 NOTE — ED PROVIDER NOTE - OBJECTIVE STATEMENT
44 y/o F with PMHx of HTN, R breast CA s/p mastectomy's 10 years ago and metastasize to the R lung 3 years ago s/o surgery, was on chemo- last chemo 3 weeks ago p/w 3 days of SOB, initially mild now worsening and progressive on exertion. Pt also c/o R chest pain x1 day described as sharp and worse when pressing on the area and taking in deep breaths. Occasional cough x1 day. No fever, chills, N/V, melena, abd pain, urinary sxs, myalgia. Recent travel to [place] and returned to [facility] on Oct 10th. 46 y/o F with PMHx of HTN, R breast CA s/p mastectomy 10 years ago, with metastasis to R lung 3 years ago s/p surgery, was on chemo- last chemo 3 weeks ago, p/w 3 days of SOB, initially mild now worsening and progressive on exertion and lying supine. Pt also c/o R chest pain x1 day described as sharp and worse when pressing on the area and taking in deep breaths. Occasional cough x1 day. No fever, chills, N/V, melena, abd pain, urinary sxs, myalgia. Recent travel from [place]. denies any leg pain/ swelling.

## 2020-10-23 NOTE — ED ADULT NURSE NOTE - OBJECTIVE STATEMENT
Pt reports recent travel to [place], reports chest pain, sob and "cough because I'm short of breath." Pt denies chills, fever, reports last chemo tx for lung cancer 2 weeks ago and last chemo on 10/29. Pt reports hx of breast cancer, cannot use right arm.

## 2020-10-23 NOTE — H&P ADULT - NSHPLABSRESULTS_GEN_ALL_CORE
.  LABS:                         14.1   6.84  )-----------( 230      ( 23 Oct 2020 11:24 )             40.3     10    138  |  105  |  15  ----------------------------<  154<H>  4.4   |  22  |  0.58    Ca    8.4      23 Oct 2020 13:06    TPro  6.8  /  Alb  3.9  /  TBili  0.4  /  DBili  x   /  AST  31  /  ALT  17  /  AlkPhos  92  10-23    PT/INR - ( 23 Oct 2020 11:24 )   PT: 12.2 sec;   INR: 1.02          PTT - ( 23 Oct 2020 11:24 )  PTT:33.9 sec  Urinalysis Basic - ( 23 Oct 2020 11:07 )    Color: Yellow / Appearance: Clear / S.010 / pH: x  Gluc: x / Ketone: NEGATIVE  / Bili: Negative / Urobili: 0.2 E.U./dL   Blood: x / Protein: NEGATIVE mg/dL / Nitrite: NEGATIVE   Leuk Esterase: Moderate / RBC: < 5 /HPF / WBC > 10 /HPF   Sq Epi: x / Non Sq Epi: 5-10 /HPF / Bacteria: Present /HPF      CARDIAC MARKERS ( 23 Oct 2020 16:49 )  x     / <0.01 ng/mL / x     / x     / x      CARDIAC MARKERS ( 23 Oct 2020 11:24 )  x     / <0.01 ng/mL / x     / x     / x          Serum Pro-Brain Natriuretic Peptide: 2756 pg/mL (10-23 @ 11:24)        RADIOLOGY, EKG & ADDITIONAL TESTS: Reviewed.

## 2020-10-23 NOTE — CONSULT NOTE ADULT - ASSESSMENT
46yo F, hx of Breast Ca (R-sided, dx ~2010, on chemo, s/p RT, with mets to R lung s/p removal), HTN, visiting from [place],   presenting with several days of worsening SOB and midsternal chest pressure,   Found with moderate-sized L-sided pleural effusion     #SOB/DUMONT - Concerning for new-onset heart failure in the setting of elevated BNP and pleural effusion, and pathological bilateral B-lines seen on bedside POCUS. Trop neg x 2, low concern for ACS.   #Pleural Effusion - Unilateral (L); moderate-sized and simple-appearing on bedside POCUS. High suspicion for malignancy given unilateral, hx of breast cancer with mets, and hx of prior pleural effusion (although always R-sided). Effusion 2/2 to heart-failure is a consideration, given new-onset SOB/DUMONT, elevated BNP, and possibility of R-sided pleural intervention (pleurodesis?), which would cause a unilateral effusion in this case. Low concern for infectious etiology.       - s/p Left-sided thoracentesis in ED on 10/23 with removal of 1000cc blood-tinged fluid  - f/u fluid studies and cytology  - Obtain formal TTE (as concern for new-onset HF, and bedside POCUS showing ?reduced contractility)   - O2 via NC PRN for goal 93-97%  - DuoNebs q6 PRN for SOB/wheezing

## 2020-10-23 NOTE — H&P ADULT - NSHPPHYSICALEXAM_GEN_ALL_CORE
.  VITAL SIGNS:  T(C): 36.6 (10-23-20 @ 14:46), Max: 36.8 (10-23-20 @ 09:38)  T(F): 97.9 (10-23-20 @ 14:46), Max: 98.2 (10-23-20 @ 09:38)  HR: 94 (10-23-20 @ 14:46) (79 - 118)  BP: 135/84 (10-23-20 @ 14:46) (134/82 - 190/134)  BP(mean): --  RR: 18 (10-23-20 @ 14:46) (18 - 20)  SpO2: 95% (10-23-20 @ 14:46) (95% - 98%)  Wt(kg): --    PHYSICAL EXAM:    Constitutional: WDWN resting comfortably in bed; NAD  Head: NC/AT  Eyes: PERRL, EOMI, anicteric sclera  ENT: no nasal discharge; uvula midline, no oropharyngeal erythema or exudates; MMM  Neck: supple; no JVD or thyromegaly  Respiratory: CTA B/L; no W/R/R, no retractions  Cardiac: +S1/S2; RRR; no M/R/G; PMI non-displaced  Gastrointestinal: abdomen soft, NT/ND; no rebound or guarding; +BSx4  Genitourinary: normal external genitalia  Back: spine midline, no bony tenderness or step-offs; no CVAT B/L  Extremities: WWP, no clubbing or cyanosis; no peripheral edema  Musculoskeletal: NROM x4; no joint swelling, tenderness or erythema  Vascular: 2+ radial, femoral, DP/PT pulses B/L  Dermatologic: skin warm, dry and intact; no rashes, wounds, or scars  Lymphatic: no submandibular or cervical LAD  Neurologic: AAOx3; CNII-XII grossly intact; no focal deficits  Psychiatric: affect and characteristics of appearance, verbalizations, behaviors are appropriate

## 2020-10-23 NOTE — H&P ADULT - PROBLEM SELECTOR PLAN 1
Pt presents with 3 days of worsening SOB. Due to pt's hx of malignancy, travel, tachycardia, and elevated d-dimer CT Angio Chest was ordered which ruled out pulmonary embolism. Imagining did demonstrate large L sided pleural effusion and small R effusion. SOB likely 2/2 to pleural effusion and less likely 2/2 cardiac and pulmonary embolism ruled out.   - pt currently not using any accessory muscles or in acute distress   - management of pleural effusion as below
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
## 2020-10-23 NOTE — CONSULT NOTE ADULT - ATTENDING COMMENTS
Seen and examined by me - history of metastatic breast cancer on therapy (? adriamycin) as above, here w 3d of new midline chest pressure/pain and rest dyspnea. Imaging shows new L effusion which she states was not seen on routine scans just about 2 weeks ago. L thora done by us at bedside, 1L serosanguinous fluid. Pls follow CXR and fluid studies.

## 2020-10-23 NOTE — PATIENT PROFILE ADULT - LEGAL HELP
Last seen: 2/21/17  RTC: 1 month  Cancel: 3/21/17 (not feeling well)  No-show: none  Next appt: 5/2/17    Incoming refill from pt via phone    Medication requested: Concerta 54 mg tab  Directions: Take 1 tab qam  Qty: 30  Last refilled: 2/25/17 per [facility]    Two scripts printed and placed in Dr. Ricks's folder for approval/signature.       no

## 2020-10-23 NOTE — PATIENT PROFILE ADULT - STATED REASON FOR ADMISSION
Addended by: KHANH DAMON on: 1/24/2018 11:04 AM     Modules accepted: Orders    
SOB/pleural effusion

## 2020-10-23 NOTE — H&P ADULT - PROBLEM SELECTOR PLAN 6
F: tolerating PO, no IVF  E: replete K<4, Mg<2  N: Dash/TLC On pain regimen 2/2 to her malignancy. Fentanyl 25 patch Q 72 hours, Dilaudid 4 mg Q 6H PRN, tizanidine 2mg q8h PRN, gabapentin 300 TID.  - continue home medication

## 2020-10-23 NOTE — H&P ADULT - HISTORY OF PRESENT ILLNESS
44 y/o F with PMHx of HTN, R breast CA s/p mastectomy's 10 years ago and metastasize to the R lung 3 years ago s/o surgery, was on chemo- last chemo 3 weeks ago p/w 3 days of progressive SOB.    In the ED T: 98.2 HR: 118 BP: 190/134-> 135/84 RR: 20 Saturating 95% on RA . Labs remarkable for d-dimer 688, proBNP 2756, CXR demonstrating left MediPort catheter. Right surgical clips, status post prior thoracotomy. Right apical opacity, stable.. Bibasilar opacities/pleural effusions. CT Chest PE protocol demonstrating Large left and small right pleural effusions,  Findings suggesting mild pulmonary edema, Post radiation changes and volume loss in the right lung, Masslike soft tissue in left breast suspicious for primary malignancy. Suspected multiple liver and multiple spinal osseous metastases, Mildly enlarged right hilar nodes suspicious for metastatic adenopathy. Pt received IV CTX + Azithro, IV Morphine 4mg x 2, Ketorolac 15mg , Labetolol 10mg. Pt admitted to [facility] for further evaluation and management of shortness of breathe likely 2/2 to L sided pleural effusion. 44 y/o F with PMHx of HTN, R breast CA s/p mastectomy's 10 years ago and metastasize to the R lung 3 years ago s/o surgery, was on chemo- last chemo 3 weeks ago p/w 3 days of progressive SOB.    In the ED T: 98.2 HR: 118 BP: 190/134-> 135/84 RR: 20 Saturating 95% on RA . Labs remarkable for d-dimer 688, proBNP 2756, trops negative. EKG with sinus tachycardia. CXR demonstrating left MediPort catheter. Right surgical clips, status post prior thoracotomy. Right apical opacity, stable.. Bibasilar opacities/pleural effusions. CT Chest PE protocol demonstrating Large left and small right pleural effusions,  Findings suggesting mild pulmonary edema, Post radiation changes and volume loss in the right lung, Masslike soft tissue in left breast suspicious for primary malignancy. Suspected multiple liver and multiple spinal osseous metastases, Mildly enlarged right hilar nodes suspicious for metastatic adenopathy. Pt received IV CTX + Azithro, IV Morphine 4mg x 2, Ketorolac 15mg , Labetolol 10mg. Pt admitted to [facility] for further evaluation and management of shortness of breathe likely 2/2 to L sided pleural effusion. 46 y/o F with PMHx of HTN, R breast CA s/p mastectomy's 10 years ago and metastasize to the R lung 3 years ago s/o surgery, was on chemo- last chemo 3 weeks ago p/w 3 days of progressive SOB.    In the ED T: 98.2 HR: 118 BP: 190/134-> 135/84 RR: 20 Saturating 95% on RA . Labs remarkable for d-dimer 688, proBNP 2756, trops negative. EKG with sinus tachycardia. CXR demonstrating left MediPort catheter. Right surgical clips, status post prior thoracotomy. Right apical opacity, stable.. Bibasilar opacities/pleural effusions. CT Chest PE protocol demonstrating Large left and small right pleural effusions,  Findings suggesting mild pulmonary edema, Post radiation changes and volume loss in the right lung, Masslike soft tissue in left breast suspicious for primary malignancy. Suspected multiple liver and multiple spinal osseous metastases, Mildly enlarged right hilar nodes suspicious for metastatic adenopathy. Pt received IV CTX + Azithro, IV Morphine 4mg x 2, Ketorolac 15mg , Labetolol 10mg. Pulmonology consulted in ED and performed bedside thoracentesis. Pt admitted to [facility] for further evaluation and management of shortness of breathe likely 2/2 to L sided pleural effusion. 46 y/o F with PMHx of HTN, R breast CA s/p mastectomy's 10 years ago and metastasize to the R lung 3 years ago s/o surgery, was on chemo- last chemo 3 weeks ago p/w 3 days of progressive SOB.    Per patient s    In the ED T: 98.2 HR: 118 BP: 190/134-> 135/84 RR: 20 Saturating 95% on RA . Labs remarkable for d-dimer 688, proBNP 2756, trops negative. EKG with sinus tachycardia. CXR demonstrating left MediPort catheter. Right surgical clips, status post prior thoracotomy. Right apical opacity, stable. Bibasilar opacities/pleural effusions. CT Chest PE protocol demonstrating Large left and small right pleural effusions,  Findings suggesting mild pulmonary edema, Post radiation changes and volume loss in the right lung, Masslike soft tissue in left breast suspicious for primary malignancy. Suspected multiple liver and multiple spinal osseous metastases, Mildly enlarged right hilar nodes suspicious for metastatic adenopathy. Pt received IV CTX + Azithro, IV Morphine 4mg x 2, Ketorolac 15mg , Labetolol 10mg. Pulmonology consulted in ED and performed bedside thoracentesis. Pt admitted to [facility] for further evaluation and management of shortness of breathe likely 2/2 to L sided pleural effusion. 44 y/o F with PMHx of HTN, R breast CA s/p mastectomy's 10 years ago and metastasize to the R lung 3 years ago s/o surgery, was on chemo- last chemo 3 weeks ago p/w 3 days of progressive SOB.    Per patient she has been having 3 days of progressively worsening CP, SOB, cough and fatigue. Describes CP as non radiating chest pressure that is constant but worsens when lying flat, improves with sitting upright and standing up. Also endorses 3 episodes of NBNB vomiting on Wednesday. Per patient symptoms, worsened at 3 am this morning and that is why she decided to come to the ED. Denies any abdominal pain, LE edema.     In the ED T: 98.2 HR: 118 BP: 190/134-> 135/84 RR: 20 Saturating 95% on RA . Labs remarkable for d-dimer 688, proBNP 2756, trops negative. EKG with sinus tachycardia. CXR demonstrating left MediPort catheter. Right surgical clips, status post prior thoracotomy. Right apical opacity, stable. Bibasilar opacities/pleural effusions. CT Chest PE protocol demonstrating Large left and small right pleural effusions,  Findings suggesting mild pulmonary edema, Post radiation changes and volume loss in the right lung, Masslike soft tissue in left breast suspicious for primary malignancy. Suspected multiple liver and multiple spinal osseous metastases, Mildly enlarged right hilar nodes suspicious for metastatic adenopathy. Pt received IV CTX + Azithro, IV Morphine 4mg x 2, Ketorolac 15mg , Labetolol 10mg. Pulmonology consulted in ED and performed bedside thoracentesis. Pt admitted to [facility] for further evaluation and management of shortness of breathe likely 2/2 to L sided pleural effusion. 44 y/o F with PMHx of HTN, R breast CA s/p mastectomy's 10 years ago and metastasize to the R lung 3 years ago s/o surgery, was on chemo- last chemo 3 weeks ago p/w 3 days of progressive SOB.    Per patient she has been having 3 days of progressively worsening CP, SOB, cough and fatigue. Describes CP as non radiating chest pressure that is constant but worsens when lying flat, improves with sitting upright and standing up. Not associated with eating. Had one episode of SOB 1 week ago while walking in the mall, went home, rested and SOB improved. Remained symptom free until 3 days ago when she started to once again feel SOB and fatigued and had 3 episodes of NBNB vomiting. At that time patient was concerned she may have covid so she went to clinic and got a rapid covid test that came back negative. Per patient symptoms (CP and SOB) worsened at 3 am this morning and that is why she decided to come to the ED. Denies any palpitations, productive coughm n/v/d, abdominal pain, LE edema.     In the ED T: 98.2 HR: 118 BP: 190/134-> 135/84 RR: 20 Saturating 95% on RA . Labs remarkable for d-dimer 688, proBNP 2756, trops negative. EKG with sinus tachycardia. CXR demonstrating left MediPort catheter. Right surgical clips, status post prior thoracotomy. Right apical opacity, stable. Bibasilar opacities/pleural effusions. CT Chest PE protocol demonstrating Large left and small right pleural effusions,  Findings suggesting mild pulmonary edema, Post radiation changes and volume loss in the right lung, Masslike soft tissue in left breast suspicious for primary malignancy. Suspected multiple liver and multiple spinal osseous metastases, Mildly enlarged right hilar nodes suspicious for metastatic adenopathy. Pt received IV CTX + Azithro, IV Morphine 4mg x 2, Ketorolac 15mg , Labetolol 10mg. Pulmonology consulted in ED and performed bedside thoracentesis. Pt admitted to [facility] for further evaluation and management of shortness of breathe likely 2/2 to L sided pleural effusion.

## 2020-10-23 NOTE — ED PROVIDER NOTE - CLINICAL SUMMARY MEDICAL DECISION MAKING FREE TEXT BOX
Here for SOB, R sided pleuritic CP w/ history of metastasized breast CA to R lung, recently traveled to [place] in the past 2 weeks. Pt is afebrile, hypertensive, and mildly tachycardiac upon arrival. Pt did not take her anti hypertensive medications today. Concern for PE or COVID. Pt placed in isolation. Will obtain labs including d-dimer and troponin. Obtain CT chest to r/o PE. Will give labetalol, Toradol, and reassess. Impression: sob and R sided pleuritic CP w/ history of metastatic r breast CA to R lung, recently traveled from [place] 2 weeks ago. Pt is afebrile, hypertensive, and mildly tachycardiac upon arrival. Pt did not take her anti hypertensive medications today. Concern for PE, COVID. Pt placed in isolation. Will obtain labs including d-dimer and troponin. Obtain CT chest to r/o PE. Will give labetalol, Toradol, and reassess.    No leukocytosis. Renal function intact. Trop neg x 2. D-dimer and bnp elev. CXR + for b/l pleural effusions, ? associated infiltrates. CTA chest neg for pe; + b/l pleural effusions, L > R, as well as other findings suggestive of mets. Pulm consult obtained and thoracentesis performed at bedside w/ drainage of ~1L of fluid. Will admit to hospitalist c for further management.

## 2020-10-23 NOTE — H&P ADULT - ASSESSMENT
44 y/o F (from [place]) with PMHx of HTN, R breast CA s/p mastectomy's 10 years ago and metastasize to the R lung 3 years ago s/o surgery, was on chemo- last chemo 3 weeks ago p/w 3 days of progressive SOB.

## 2020-10-23 NOTE — H&P ADULT - PROBLEM SELECTOR PLAN 7
F: tolerating PO, no IVF  E: replete K<4, Mg<2  N: Dash/TLC F: tolerating PO, no IVF  E: replete K<4, Mg<2  N: Dash/TLC    DVTPpx: Lovenox

## 2020-10-24 DIAGNOSIS — C34.90 MALIGNANT NEOPLASM OF UNSPECIFIED PART OF UNSPECIFIED BRONCHUS OR LUNG: ICD-10-CM

## 2020-10-24 DIAGNOSIS — J96.01 ACUTE RESPIRATORY FAILURE WITH HYPOXIA: ICD-10-CM

## 2020-10-24 LAB
B PERT IGG+IGM PNL SER: SIGNIFICANT CHANGE UP
COLOR FLD: SIGNIFICANT CHANGE UP
COMMENT - FLUIDS: SIGNIFICANT CHANGE UP
CULTURE RESULTS: NO GROWTH — SIGNIFICANT CHANGE UP
EOSINOPHIL # FLD: 1 % — SIGNIFICANT CHANGE UP
FLUID INTAKE SUBSTANCE CLASS: SIGNIFICANT CHANGE UP
FLUID SEGMENTED GRANULOCYTES: 2 % — SIGNIFICANT CHANGE UP
LYMPHOCYTES # FLD: 79 % — SIGNIFICANT CHANGE UP
MESOTHL CELL # FLD: 3 % — SIGNIFICANT CHANGE UP
MONOS+MACROS # FLD: 16 % — SIGNIFICANT CHANGE UP
NIGHT BLUE STAIN TISS: SIGNIFICANT CHANGE UP
RCV VOL RI: HIGH /UL (ref 0–0)
SPECIMEN SOURCE FLD: SIGNIFICANT CHANGE UP
SPECIMEN SOURCE: SIGNIFICANT CHANGE UP
SPECIMEN SOURCE: SIGNIFICANT CHANGE UP
TOTAL NUCLEATED CELL COUNT, BODY FLUID: 354 /UL — SIGNIFICANT CHANGE UP
TRIGL FLD-MCNC: 19 MG/DL — SIGNIFICANT CHANGE UP
TUBE TYPE: SIGNIFICANT CHANGE UP

## 2020-10-24 PROCEDURE — 99232 SBSQ HOSP IP/OBS MODERATE 35: CPT | Mod: GC

## 2020-10-24 RX ORDER — METOCLOPRAMIDE HCL 10 MG
10 TABLET ORAL ONCE
Refills: 0 | Status: COMPLETED | OUTPATIENT
Start: 2020-10-24 | End: 2020-10-24

## 2020-10-24 RX ORDER — FAMOTIDINE 10 MG/ML
20 INJECTION INTRAVENOUS ONCE
Refills: 0 | Status: COMPLETED | OUTPATIENT
Start: 2020-10-24 | End: 2020-10-24

## 2020-10-24 RX ADMIN — LOSARTAN POTASSIUM 25 MILLIGRAM(S): 100 TABLET, FILM COATED ORAL at 05:44

## 2020-10-24 RX ADMIN — ONDANSETRON 4 MILLIGRAM(S): 8 TABLET, FILM COATED ORAL at 23:31

## 2020-10-24 RX ADMIN — HYDROMORPHONE HYDROCHLORIDE 0.5 MILLIGRAM(S): 2 INJECTION INTRAMUSCULAR; INTRAVENOUS; SUBCUTANEOUS at 23:10

## 2020-10-24 RX ADMIN — FENTANYL CITRATE 1 PATCH: 50 INJECTION INTRAVENOUS at 18:00

## 2020-10-24 RX ADMIN — GABAPENTIN 300 MILLIGRAM(S): 400 CAPSULE ORAL at 13:02

## 2020-10-24 RX ADMIN — HYDROMORPHONE HYDROCHLORIDE 0.5 MILLIGRAM(S): 2 INJECTION INTRAMUSCULAR; INTRAVENOUS; SUBCUTANEOUS at 03:29

## 2020-10-24 RX ADMIN — GABAPENTIN 300 MILLIGRAM(S): 400 CAPSULE ORAL at 22:10

## 2020-10-24 RX ADMIN — HYDROMORPHONE HYDROCHLORIDE 0.5 MILLIGRAM(S): 2 INJECTION INTRAMUSCULAR; INTRAVENOUS; SUBCUTANEOUS at 03:44

## 2020-10-24 RX ADMIN — HYDROMORPHONE HYDROCHLORIDE 0.5 MILLIGRAM(S): 2 INJECTION INTRAMUSCULAR; INTRAVENOUS; SUBCUTANEOUS at 16:30

## 2020-10-24 RX ADMIN — ONDANSETRON 4 MILLIGRAM(S): 8 TABLET, FILM COATED ORAL at 10:29

## 2020-10-24 RX ADMIN — GABAPENTIN 300 MILLIGRAM(S): 400 CAPSULE ORAL at 05:44

## 2020-10-24 RX ADMIN — HYDROMORPHONE HYDROCHLORIDE 0.5 MILLIGRAM(S): 2 INJECTION INTRAMUSCULAR; INTRAVENOUS; SUBCUTANEOUS at 09:34

## 2020-10-24 RX ADMIN — FAMOTIDINE 20 MILLIGRAM(S): 10 INJECTION INTRAVENOUS at 13:02

## 2020-10-24 RX ADMIN — FENTANYL CITRATE 1 PATCH: 50 INJECTION INTRAVENOUS at 06:29

## 2020-10-24 RX ADMIN — HYDROMORPHONE HYDROCHLORIDE 0.5 MILLIGRAM(S): 2 INJECTION INTRAMUSCULAR; INTRAVENOUS; SUBCUTANEOUS at 16:09

## 2020-10-24 RX ADMIN — Medication 10 MILLIGRAM(S): at 13:02

## 2020-10-24 RX ADMIN — HYDROMORPHONE HYDROCHLORIDE 0.5 MILLIGRAM(S): 2 INJECTION INTRAMUSCULAR; INTRAVENOUS; SUBCUTANEOUS at 22:10

## 2020-10-24 RX ADMIN — ENOXAPARIN SODIUM 40 MILLIGRAM(S): 100 INJECTION SUBCUTANEOUS at 22:10

## 2020-10-24 RX ADMIN — HYDROMORPHONE HYDROCHLORIDE 0.5 MILLIGRAM(S): 2 INJECTION INTRAMUSCULAR; INTRAVENOUS; SUBCUTANEOUS at 10:00

## 2020-10-24 RX ADMIN — FENTANYL CITRATE 1 PATCH: 50 INJECTION INTRAVENOUS at 05:54

## 2020-10-24 NOTE — DISCHARGE NOTE PROVIDER - NSDCFUADDAPPT_GEN_ALL_CORE_FT
You have an appt with your Oncologist on 10/29 -You have an appt with your Oncologist on 10/29     -Please follow up with an outpatient pulmonologist   -Please follow up with an outpatient cardiologist as you will likely need additional medications

## 2020-10-24 NOTE — DISCHARGE NOTE PROVIDER - NSDCCPCAREPLAN_GEN_ALL_CORE_FT
PRINCIPAL DISCHARGE DIAGNOSIS  Diagnosis: Pleural effusion  Assessment and Plan of Treatment: You came to the hospital with shortness of breath. You were found to have fluid in your lung which was removed. You have had similar episodes before. The fluid seems to be coming from --      SECONDARY DISCHARGE DIAGNOSES  Diagnosis: Malignant neoplasm of lung, unspecified laterality, unspecified part of lung  Assessment and Plan of Treatment:      PRINCIPAL DISCHARGE DIAGNOSIS  Diagnosis: Pleural effusion  Assessment and Plan of Treatment: You came to the hospital with shortness of breath. You were found to have fluid in your lung which was removed. You have had similar episodes before.   -The fluid may be coming from either your lung cancer or from fluid backing up for your heart and seeping into your lungs  -Your heart ultrasoubnd showed decreased contractility of the heart which can cause fluid to get backed up in leaving the heart and therefore enter the lungs  -Please start a new medication to help with fluid overload: lasix 20mg once a day  -Please follow up with your outpatient pulmonologist as you will likely need additional medications      SECONDARY DISCHARGE DIAGNOSES  Diagnosis: Malignant neoplasm of lung, unspecified laterality, unspecified part of lung  Assessment and Plan of Treatment: You have been diagnosed with metastatic breast cancer which has spread to your lungs. Your next chemotherapy treatment is scheduled for 10/29 with Dr. Bui.   -Please continue to follow up with your oncologist, Dr. Bui  -We will fax all your inpatient work-up documents to Dr. Bui's office fax ([phone])     PRINCIPAL DISCHARGE DIAGNOSIS  Diagnosis: Pleural effusion  Assessment and Plan of Treatment: You came to the hospital with shortness of breath. You were found to have fluid in your lung which was removed. You have had similar episodes before.   -The fluid may be coming from either your lung cancer or from fluid backing up for your heart and seeping into your lungs  -Your heart ultrasoubnd showed decreased contractility of the heart which can cause fluid to get backed up in leaving the heart and therefore enter the lungs  -Please start a new medication to help with fluid overload: lasix 20mg once a day  -Please follow up with your outpatient pulmonologist  -Please follow up with your outpatient cardiologist as you will likely need additional medications      SECONDARY DISCHARGE DIAGNOSES  Diagnosis: Malignant neoplasm of lung, unspecified laterality, unspecified part of lung  Assessment and Plan of Treatment: You have been diagnosed with metastatic breast cancer which has spread to your lungs. Your next chemotherapy treatment is scheduled for 10/29 with Dr. Bui.   -Please continue to follow up with your oncologist, Dr. Bui  -We will fax all your inpatient work-up documents to Dr. Bui's office fax ([phone])

## 2020-10-24 NOTE — DISCHARGE NOTE PROVIDER - NSDCMRMEDTOKEN_GEN_ALL_CORE_FT
fentanyl topical 25 mcg/hr transdermal film, extended release (obsolete): 1 patch transdermal every 72 hours, As Needed  gabapentin 300 mg oral capsule: 1 cap(s) orally 3 times a day  HYDROmorphone 2 mg oral tablet: 1 tab(s) orally every 6 hours, As Needed -for moderate pain MDD:4   losartan 25 mg oral tablet: 1 tab(s) orally once a day  tiZANidine 2 mg oral tablet: 1 tab(s) orally every 8 hours, As Needed   fentanyl topical 25 mcg/hr transdermal film, extended release (obsolete): 1 patch transdermal every 72 hours, As Needed  furosemide 20 mg oral tablet: 1 tab(s) orally once a day (in the morning)   gabapentin 300 mg oral capsule: 1 cap(s) orally 3 times a day  HYDROmorphone 2 mg oral tablet: 1 tab(s) orally every 6 hours, As Needed -for moderate pain MDD:4   losartan 25 mg oral tablet: 1 tab(s) orally once a day  tiZANidine 2 mg oral tablet: 1 tab(s) orally every 8 hours, As Needed

## 2020-10-24 NOTE — DISCHARGE NOTE PROVIDER - HOSPITAL COURSE
This is a 44 y/o F with PMHx of HTN, R breast CA s/p mastectomy's 10 years ago and metastasize to the R lung 3 years ago s/o surgery, was on chemo- last chemo 3 weeks ago p/w 3 days of progressive SOB. In the ED, she had a CT Chest PE protocol  done demonstrating Large left and small right pleural effusions with suspected multiple liver and spinal osseous metastases. Pulmonology consulted in ED and performed bedside thoracentesis. Pt admitted to [facility] for management of shortness of breath 2/2 to L sided pleural effusion.     #Bilateral Pleural Effusions   Patient with hx of recurrent right sided effusions as OP. Patient found to have large L sided pleural effusion and small R effusion on CT Chest Angio PE. Pleural effusion likely transudative given Light's criteria: fluid LDH and protein 95& 2.8. Serum LDH and protein: 283 & 6.8. Patient with elevated BNP and bedside POCUS showing decreased contractility.  -  She had an Echo done that showed -----. The effusion was thought to be 2/2     #Breast CA with mets  Patient follows with oncologist Buster Bui in [place] where she lives. Per patient last chemo was on October 3rd and next session is scheduled for October 29th  - outpatient follow-up with her oncologist.          #Discharge: do not delete    Patient is 44 yo F with past medical history of HTN, Breast CA s/p sx + chemorads, metastasize to the R lung 3 years ago s/p surgery, h/o R sided pleural effusion, w recent chemotherapy (10/3)  Presented with dyspnea, found to have acute hypoxemic respiratory failure 2/2 L>R effusion in addition to liver and bone lesions, s/p 1L thoracentesis by pulmonary 10/24  Problem List/Main Diagnoses (system-based):   #Bilateral Pleural Effusions   Patient with hx of recurrent right sided effusions as OP. Patient found to have large L sided pleural effusion and small R effusion on CT Chest Angio PE. Pleural effusion likely transudative given Light's criteria: fluid LDH and protein 95& 2.8. Serum LDH and protein: 283 & 6.8. Noted possibility of chemotherapy induced cardiomyopathy in setting of BNP 2800 and transudative pattern on labs. Discussed merits of ruling out cardiomyopathy with TTE. Patient saturating well on RA at rest and with walking prior to discharge.   -  Echo done 10/26 prior to discharge   1. Mild symmetric left ventricular hypertrophy.  2. Moderately left reduced ventricular systolic function.  3. Normal right ventricular size and systolic function.  4. No significant valvular disease.  5. No evidence of pulmonary hypertension.  6. No pericardial effusion.  7. Left pleural effusion.    -Follow up with outpatient pulmonologist    #Breast CA with mets  Patient follows with oncologist Buster Bui in [place] where she lives. Per patient last chemo was on October 3rd and next session is scheduled for October 29th  - outpatient follow-up with her oncologist.     Inpatient treatment course:   Admitted for acute hypoxic respiratory failure. Found to have bilateral pleural effusions L>R, in addition to liver and bone lesions, s/p 1L thoracentesis by pulmonary 10/24. Patient improved. Had echo on 10/26 given concern for chemo induced cardiomyopathy and found to have global LV hypokinesis and EF 39%.     New medications:   None    Labs to be followed outpatient:   Pleural fluid cytology    Exam to be followed outpatient:

## 2020-10-24 NOTE — PROGRESS NOTE ADULT - PROBLEM SELECTOR PLAN 1
Suspicious for malignancy given her history; s/p thoracentesis 10/23 and 1000cc fluid removal.     *Lymphocyte predominant and exudative by Light's criteria (LDH)  *Cultures NGTD  *Cytology PENDING    Recommendations:  - f/u remaining pleural fluid studies, particularly cytology, though likely represents malignancy Suspicious for malignancy given her history; s/p thoracentesis 10/23 and 1000cc fluid removal.     *Lymphocyte predominant and transudative by Light's criteria (LDH)  *Cultures NGTD  *Cytology PENDING    Recommendations:  - f/u remaining pleural fluid studies, particularly cytology, though likely represents malignancy

## 2020-10-24 NOTE — DISCHARGE NOTE NURSING/CASE MANAGEMENT/SOCIAL WORK - PATIENT PORTAL LINK FT
You can access the FollowMyHealth Patient Portal offered by [facility] by registering at the following website: [email or web address]. By joining [employer]’s FollowMyHealth portal, you will also be able to view your health information using other applications (apps) compatible with our system.

## 2020-10-24 NOTE — DISCHARGE NOTE PROVIDER - PROVIDER TOKENS
FREE:[LAST:[Dr. Bui],FIRST:[Buster],PHONE:[(   )    -],FAX:[(   )    -],ADDRESS:[[address] [place] [address] [phone]]]

## 2020-10-24 NOTE — PROGRESS NOTE ADULT - ASSESSMENT
46yo woman w/ hx of breastCA (right side dx ~2010, on chemo, s/p RT, with mets to R lung s/p removal), HTN who is in [facility] visiting from [place], and presenting with several days of worsening SOB and midsternal chest pressure; found with moderate-sized L-sided pleural effusion and now s/p diagnostic and therapeutic thoracentesis 10/24.

## 2020-10-24 NOTE — PROGRESS NOTE ADULT - ASSESSMENT
Overall appearing improved from respiratory standpoint, however pt endorsing nausea w NBNB emesis during visit    --Anti-emetics, GI cocktail for nausea and emesis  --Ambulatory pulse ox - found to have 94% on RA  --Would touch base w pulmonary regarding outpatient f/u prior to flight on 11/3    DISPO: Home w self care. Anticipate DC 10/25 once pulm f/u set up and pt tolerating soft diet     #Bilateral pleural effusions - L>R. s/p 1L thoracentesis by pulmonary 10/23 w sxs improving. Labs sent off. Suspicious for malignant effusion in setting of metastatic disease to bone and liver. DDx for dyspnea: negative CTA for PE.   #Breast CA - pt continued on home analgesics - fentanyl patch 25, gabapentin 300 TID, zanaflex  #HTN - Borderline high 150/100 - on home losartan  Overall appearing improved from respiratory standpoint, however pt endorsing nausea w NBNB emesis during visit    --Titrate NS to sat >90%  --TTE to evaluate cardiac fxn  --Anti-emetics, GI cocktail for nausea and emesis  --Ambulatory pulse ox - found to have 94% on RA  --Would touch base w pulmonary regarding outpatient f/u prior to flight on 11/3    DISPO: Home w self care. Anticipate DC 10/25 once pulm f/u set up and pt tolerating soft diet   45F w h/o HTN, Breast CA s/p sx + chemorads, h/o R sided pleural effusion, w recent chemotherapy p/w dyspnea found to have acute hypoxemic respiratory failure 2/2 L>R effusion in addition to liver and bone lesions, s/p 1L thoracentesis by pulmonary 10/24    #Bilateral pleural effusions - L>R. s/p 1L thoracentesis by pulmonary 10/23 w sxs improving. Labs sent off. Suspicious for malignant effusion in setting of metastatic disease to bone and liver. DDx for dyspnea: negative CTA for PE.   #Breast CA - pt continued on home analgesics - fentanyl patch 25, gabapentin 300 TID, zanaflex  #HTN - Borderline high 150/100 - on home losartan  Overall appearing improved from respiratory standpoint, however pt endorsing nausea w NBNB emesis during visit    --Titrate NS to sat >90%  --TTE to evaluate cardiac fxn  --Anti-emetics, GI cocktail for nausea and emesis  --Ambulatory pulse ox - found to have 94% on RA  --Would touch base w pulmonary regarding outpatient f/u prior to flight on 11/3    DISPO: Home w self care. Anticipate DC 10/25 once pulm f/u set up and pt tolerating soft diet. Will need records faxed to outpatient oncologist in [place] w close f/u

## 2020-10-24 NOTE — PROGRESS NOTE ADULT - SUBJECTIVE AND OBJECTIVE BOX
PULMONARY CONSULT SERVICE FOLLOW-UP NOTE    INTERVAL HPI:  Reviewed chart and overnight events; patient seen and examined at bedside. No new complaints today. No cough, SOB, CP, pleurisy.     MEDICATIONS:  Pulmonary:    Antimicrobials:    Anticoagulants:  enoxaparin Injectable 40 milliGRAM(s) SubCutaneous every 24 hours    Cardiac:  losartan 25 milliGRAM(s) Oral daily    Allergies    No Known Allergies    Intolerances    Vital Signs Last 24 Hrs  T(C): 37 (24 Oct 2020 13:10), Max: 37 (24 Oct 2020 05:54)  T(F): 98.6 (24 Oct 2020 13:10), Max: 98.6 (24 Oct 2020 05:54)  HR: 99 (24 Oct 2020 13:10) (93 - 99)  BP: 157/100 (24 Oct 2020 13:10) (152/93 - 157/100)  BP(mean): --  RR: 19 (24 Oct 2020 13:10) (18 - 19)  SpO2: 97% (24 Oct 2020 13:10) (94% - 97%)    PHYSICAL EXAM:  Constitutional: WDWN woman resting in bed  HEENT: NC/AT; PERRL, anicteric sclera; MMM  Neck: supple  Cardiovascular: +S1/S2, RRR  Respiratory: CTA B/L; no W/R/R  Gastrointestinal: soft, NT/ND  Extremities: WWP; no edema, clubbing or cyanosis  Vascular: 2+ radial pulses B/L  Neurological: awake and alert; OCONNELL    LABS:  CBC Full  -  ( 23 Oct 2020 11:24 )  WBC Count : 6.84 K/uL  RBC Count : 4.39 M/uL  Hemoglobin : 14.1 g/dL  Hematocrit : 40.3 %  Platelet Count - Automated : 230 K/uL  Mean Cell Volume : 91.8 fl  Mean Cell Hemoglobin : 32.1 pg  Mean Cell Hemoglobin Concentration : 35.0 gm/dL  Auto Neutrophil # : 5.08 K/uL  Auto Lymphocyte # : 1.34 K/uL  Auto Monocyte # : 0.36 K/uL  Auto Eosinophil # : 0.01 K/uL  Auto Basophil # : 0.03 K/uL  Auto Neutrophil % : 74.3 %  Auto Lymphocyte % : 19.6 %  Auto Monocyte % : 5.3 %  Auto Eosinophil % : 0.1 %  Auto Basophil % : 0.4 %    10-23    138  |  105  |  15  ----------------------------<  154<H>  4.4   |  22  |  0.58    Ca    8.4      23 Oct 2020 13:06    TPro  6.8  /  Alb  3.9  /  TBili  0.4  /  DBili  x   /  AST  31  /  ALT  17  /  AlkPhos  92  10-23    PT/INR - ( 23 Oct 2020 11:24 )   PT: 12.2 sec;   INR: 1.02          PTT - ( 23 Oct 2020 11:24 )  PTT:33.9 sec      Urinalysis Basic - ( 23 Oct 2020 11:07 )    Color: Yellow / Appearance: Clear / S.010 / pH: x  Gluc: x / Ketone: NEGATIVE  / Bili: Negative / Urobili: 0.2 E.U./dL   Blood: x / Protein: NEGATIVE mg/dL / Nitrite: NEGATIVE   Leuk Esterase: Moderate / RBC: < 5 /HPF / WBC > 10 /HPF   Sq Epi: x / Non Sq Epi: 5-10 /HPF / Bacteria: Present /HPF    RADIOLOGY & ADDITIONAL STUDIES:  Personally reviewed post-procedural CXR, no PTX

## 2020-10-24 NOTE — DISCHARGE NOTE PROVIDER - CARE PROVIDER_API CALL
Dr. Bui [address], [place], [place] [address] [phone]  Phone: (   )    -  Fax: (   )    -  Follow Up Time:

## 2020-10-24 NOTE — PROGRESS NOTE ADULT - SUBJECTIVE AND OBJECTIVE BOX
INTERVAL HPI/OVERNIGHT EVENTS:    SUBJECTIVE: Patient seen and examined at bedside.     ROS:  CV: Denies chest pain  Resp: Denies SOB  GI: Denies abdominal pain, constipation, diarrhea, nausea, vomiting  : Denies dysuria  ID: Denies fevers, chills  MSK: Denies joint pain     OBJECTIVE:    VITAL SIGNS:  ICU Vital Signs Last 24 Hrs  T(C): 37 (24 Oct 2020 13:10), Max: 37.3 (23 Oct 2020 19:35)  T(F): 98.6 (24 Oct 2020 13:10), Max: 99.1 (23 Oct 2020 19:35)  HR: 99 (24 Oct 2020 13:10) (91 - 100)  BP: 157/100 (24 Oct 2020 13:10) (139/86 - 157/100)  BP(mean): --  ABP: --  ABP(mean): --  RR: 19 (24 Oct 2020 13:10) (17 - 19)  SpO2: 97% (24 Oct 2020 13:10) (94% - 100%)        CAPILLARY BLOOD GLUCOSE          PHYSICAL EXAM:    General: NAD  HEENT: NC/AT; PERRL, clear conjunctiva  Neck: supple  Respiratory: CTA b/l  Cardiovascular: +S1/S2; RRR  Abdomen: soft, NT/ND; +BS x4  Extremities: WWP, 2+ peripheral pulses b/l; no LE edema  Skin: normal color and turgor; no rash  Neurological:     MEDICATIONS:  MEDICATIONS  (STANDING):  enoxaparin Injectable 40 milliGRAM(s) SubCutaneous every 24 hours  fentaNYL   Patch  25 MICROgram(s)/Hr 1 Patch Transdermal every 72 hours  gabapentin 300 milliGRAM(s) Oral three times a day  losartan 25 milliGRAM(s) Oral daily    MEDICATIONS  (PRN):  HYDROmorphone  Injectable 0.5 milliGRAM(s) IV Push every 6 hours PRN Moderate Pain (4 - 6)  ondansetron Injectable 4 milliGRAM(s) IV Push every 12 hours PRN Nausea and/or Vomiting  tiZANidine 2 milliGRAM(s) Oral every 8 hours PRN Muscle Spasm      ALLERGIES:  Allergies    No Known Allergies    Intolerances        LABS:                        14.1   6.84  )-----------( 230      ( 23 Oct 2020 11:24 )             40.3     10-23    138  |  105  |  15  ----------------------------<  154<H>  4.4   |  22  |  0.58    Ca    8.4      23 Oct 2020 13:06    TPro  6.8  /  Alb  3.9  /  TBili  0.4  /  DBili  x   /  AST  31  /  ALT  17  /  AlkPhos  92  10-23    PT/INR - ( 23 Oct 2020 11:24 )   PT: 12.2 sec;   INR: 1.02          PTT - ( 23 Oct 2020 11:24 )  PTT:33.9 sec  Urinalysis Basic - ( 23 Oct 2020 11:07 )    Color: Yellow / Appearance: Clear / S.010 / pH: x  Gluc: x / Ketone: NEGATIVE  / Bili: Negative / Urobili: 0.2 E.U./dL   Blood: x / Protein: NEGATIVE mg/dL / Nitrite: NEGATIVE   Leuk Esterase: Moderate / RBC: < 5 /HPF / WBC > 10 /HPF   Sq Epi: x / Non Sq Epi: 5-10 /HPF / Bacteria: Present /HPF        RADIOLOGY & ADDITIONAL TESTS: Reviewed. INTERVAL HPI/OVERNIGHT EVENTS: KATHLEEN O/N    SUBJECTIVE: Patient seen and examined at bedside.   Pt states breathing feels much better today. No chest pain. However endorsing NBNB emesis w nausea today. Denies any abd pain.    Planning to fly back to [place] on 11/3. States had Breast CA on R side s/p surgery w chemorads, Then diagnosed w ?breast/lung CA w recurrent effusions on R side receiving thoracentesis. More recently found to have metastases to liver and bone receiving chemotherapy via chest port.    OBJECTIVE:    VITAL SIGNS:  ICU Vital Signs Last 24 Hrs  T(C): 37 (24 Oct 2020 13:10), Max: 37.3 (23 Oct 2020 19:35)  T(F): 98.6 (24 Oct 2020 13:10), Max: 99.1 (23 Oct 2020 19:35)  HR: 99 (24 Oct 2020 13:10) (91 - 100)  BP: 157/100 (24 Oct 2020 13:10) (139/86 - 157/100)  BP(mean): --  ABP: --  ABP(mean): --  RR: 19 (24 Oct 2020 13:10) (17 - 19)  SpO2: 97% (24 Oct 2020 13:10) (94% - 100%)        CAPILLARY BLOOD GLUCOSE          PHYSICAL EXAM:  GEN: female in NAD on RA  HEENT: NC/AT, MMM  CV:RRR, nml s1s2, no BLE edema  PULM: Nml effort, decreased BS on L  ABD: Soft    MEDICATIONS:  MEDICATIONS  (STANDING):  enoxaparin Injectable 40 milliGRAM(s) SubCutaneous every 24 hours  fentaNYL   Patch  25 MICROgram(s)/Hr 1 Patch Transdermal every 72 hours  gabapentin 300 milliGRAM(s) Oral three times a day  losartan 25 milliGRAM(s) Oral daily    MEDICATIONS  (PRN):  HYDROmorphone  Injectable 0.5 milliGRAM(s) IV Push every 6 hours PRN Moderate Pain (4 - 6)  ondansetron Injectable 4 milliGRAM(s) IV Push every 12 hours PRN Nausea and/or Vomiting  tiZANidine 2 milliGRAM(s) Oral every 8 hours PRN Muscle Spasm      ALLERGIES:  Allergies    No Known Allergies    Intolerances        LABS:                        14.1   6.84  )-----------( 230      ( 23 Oct 2020 11:24 )             40.3     10-23    138  |  105  |  15  ----------------------------<  154<H>  4.4   |  22  |  0.58    Ca    8.4      23 Oct 2020 13:06    TPro  6.8  /  Alb  3.9  /  TBili  0.4  /  DBili  x   /  AST  31  /  ALT  17  /  AlkPhos  92  10    PT/INR - ( 23 Oct 2020 11:24 )   PT: 12.2 sec;   INR: 1.02          PTT - ( 23 Oct 2020 11:24 )  PTT:33.9 sec  Urinalysis Basic - ( 23 Oct 2020 11:07 )    Color: Yellow / Appearance: Clear / S.010 / pH: x  Gluc: x / Ketone: NEGATIVE  / Bili: Negative / Urobili: 0.2 E.U./dL   Blood: x / Protein: NEGATIVE mg/dL / Nitrite: NEGATIVE   Leuk Esterase: Moderate / RBC: < 5 /HPF / WBC > 10 /HPF   Sq Epi: x / Non Sq Epi: 5-10 /HPF / Bacteria: Present /HPF        RADIOLOGY & ADDITIONAL TESTS: Reviewed. INTERVAL HPI/OVERNIGHT EVENTS: KATHLEEN O/N    SUBJECTIVE: Patient seen and examined at bedside.   Pt states breathing feels much better today. No chest pain. However endorsing NBNB emesis w nausea today. Denies any abd pain.    Planning to fly back to [place] on 11/3. States had Breast CA on R side s/p surgery w chemorads, Then diagnosed w ?breast/lung CA w recurrent effusions on R side receiving thoracentesis. More recently found to have metastases to liver and bone receiving chemotherapy via chest port.    OBJECTIVE:    VITAL SIGNS:  ICU Vital Signs Last 24 Hrs  T(C): 37 (24 Oct 2020 13:10), Max: 37.3 (23 Oct 2020 19:35)  T(F): 98.6 (24 Oct 2020 13:10), Max: 99.1 (23 Oct 2020 19:35)  HR: 99 (24 Oct 2020 13:10) (91 - 100)  BP: 157/100 (24 Oct 2020 13:10) (139/86 - 157/100)  BP(mean): --  ABP: --  ABP(mean): --  RR: 19 (24 Oct 2020 13:10) (17 - 19)  SpO2: 97% (24 Oct 2020 13:10) (94% - 100%)        CAPILLARY BLOOD GLUCOSE          PHYSICAL EXAM:  GEN: female in NAD on RA  HEENT: NC/AT, MMM  CV:RRR, nml s1s2, no BLE edema  PULM: Nml effort, decreased BS on L  ABD: Soft, NABS, nontender  NEURO: Alert, ox3, moving all extremities  PSYCH: Appropriate and conversant    MEDICATIONS:  MEDICATIONS  (STANDING):  enoxaparin Injectable 40 milliGRAM(s) SubCutaneous every 24 hours  fentaNYL   Patch  25 MICROgram(s)/Hr 1 Patch Transdermal every 72 hours  gabapentin 300 milliGRAM(s) Oral three times a day  losartan 25 milliGRAM(s) Oral daily    MEDICATIONS  (PRN):  HYDROmorphone  Injectable 0.5 milliGRAM(s) IV Push every 6 hours PRN Moderate Pain (4 - 6)  ondansetron Injectable 4 milliGRAM(s) IV Push every 12 hours PRN Nausea and/or Vomiting  tiZANidine 2 milliGRAM(s) Oral every 8 hours PRN Muscle Spasm      ALLERGIES:  Allergies    No Known Allergies    Intolerances        LABS:                        14.1   6.84  )-----------( 230      ( 23 Oct 2020 11:24 )             40.3     10    138  |  105  |  15  ----------------------------<  154<H>  4.4   |  22  |  0.58    Ca    8.4      23 Oct 2020 13:06    TPro  6.8  /  Alb  3.9  /  TBili  0.4  /  DBili  x   /  AST  31  /  ALT  17  /  AlkPhos  92  10    PT/INR - ( 23 Oct 2020 11:24 )   PT: 12.2 sec;   INR: 1.02          PTT - ( 23 Oct 2020 11:24 )  PTT:33.9 sec  Urinalysis Basic - ( 23 Oct 2020 11:07 )    Color: Yellow / Appearance: Clear / S.010 / pH: x  Gluc: x / Ketone: NEGATIVE  / Bili: Negative / Urobili: 0.2 E.U./dL   Blood: x / Protein: NEGATIVE mg/dL / Nitrite: NEGATIVE   Leuk Esterase: Moderate / RBC: < 5 /HPF / WBC > 10 /HPF   Sq Epi: x / Non Sq Epi: 5-10 /HPF / Bacteria: Present /HPF        RADIOLOGY & ADDITIONAL TESTS: Reviewed.

## 2020-10-25 PROCEDURE — 93308 TTE F-UP OR LMTD: CPT | Mod: 26

## 2020-10-25 RX ORDER — HYDROMORPHONE HYDROCHLORIDE 2 MG/ML
1 INJECTION INTRAMUSCULAR; INTRAVENOUS; SUBCUTANEOUS EVERY 6 HOURS
Refills: 0 | Status: DISCONTINUED | OUTPATIENT
Start: 2020-10-25 | End: 2020-10-26

## 2020-10-25 RX ADMIN — FENTANYL CITRATE 1 PATCH: 50 INJECTION INTRAVENOUS at 19:11

## 2020-10-25 RX ADMIN — TIZANIDINE 2 MILLIGRAM(S): 4 TABLET ORAL at 01:46

## 2020-10-25 RX ADMIN — HYDROMORPHONE HYDROCHLORIDE 0.5 MILLIGRAM(S): 2 INJECTION INTRAMUSCULAR; INTRAVENOUS; SUBCUTANEOUS at 18:55

## 2020-10-25 RX ADMIN — HYDROMORPHONE HYDROCHLORIDE 1 MILLIGRAM(S): 2 INJECTION INTRAMUSCULAR; INTRAVENOUS; SUBCUTANEOUS at 22:09

## 2020-10-25 RX ADMIN — FENTANYL CITRATE 1 PATCH: 50 INJECTION INTRAVENOUS at 07:00

## 2020-10-25 RX ADMIN — HYDROMORPHONE HYDROCHLORIDE 0.5 MILLIGRAM(S): 2 INJECTION INTRAMUSCULAR; INTRAVENOUS; SUBCUTANEOUS at 19:15

## 2020-10-25 RX ADMIN — GABAPENTIN 300 MILLIGRAM(S): 400 CAPSULE ORAL at 13:17

## 2020-10-25 RX ADMIN — GABAPENTIN 300 MILLIGRAM(S): 400 CAPSULE ORAL at 05:50

## 2020-10-25 RX ADMIN — LOSARTAN POTASSIUM 25 MILLIGRAM(S): 100 TABLET, FILM COATED ORAL at 05:50

## 2020-10-25 RX ADMIN — ENOXAPARIN SODIUM 40 MILLIGRAM(S): 100 INJECTION SUBCUTANEOUS at 22:09

## 2020-10-25 RX ADMIN — GABAPENTIN 300 MILLIGRAM(S): 400 CAPSULE ORAL at 22:09

## 2020-10-25 RX ADMIN — HYDROMORPHONE HYDROCHLORIDE 0.5 MILLIGRAM(S): 2 INJECTION INTRAMUSCULAR; INTRAVENOUS; SUBCUTANEOUS at 12:30

## 2020-10-25 RX ADMIN — HYDROMORPHONE HYDROCHLORIDE 0.5 MILLIGRAM(S): 2 INJECTION INTRAMUSCULAR; INTRAVENOUS; SUBCUTANEOUS at 07:00

## 2020-10-25 RX ADMIN — HYDROMORPHONE HYDROCHLORIDE 0.5 MILLIGRAM(S): 2 INJECTION INTRAMUSCULAR; INTRAVENOUS; SUBCUTANEOUS at 12:04

## 2020-10-25 RX ADMIN — HYDROMORPHONE HYDROCHLORIDE 0.5 MILLIGRAM(S): 2 INJECTION INTRAMUSCULAR; INTRAVENOUS; SUBCUTANEOUS at 05:52

## 2020-10-25 RX ADMIN — HYDROMORPHONE HYDROCHLORIDE 1 MILLIGRAM(S): 2 INJECTION INTRAMUSCULAR; INTRAVENOUS; SUBCUTANEOUS at 23:05

## 2020-10-25 NOTE — PROGRESS NOTE ADULT - PROBLEM SELECTOR PLAN 6
On pain regimen 2/2 to her malignancy. Fentanyl 25 patch Q 72 hours, Dilaudid 4 mg Q 6H PRN, tizanidine 2mg q8h PRN, gabapentin 300 TID.  - continue home medication

## 2020-10-25 NOTE — PROGRESS NOTE ADULT - ASSESSMENT
46 y/o F (from [place]) with PMHx of HTN, R breast CA s/p mastectomy's 10 years ago and metastasize to the R lung 3 years ago s/o surgery, was on chemo- last chemo 3 weeks ago p/w 3 days of progressive SOB.

## 2020-10-25 NOTE — PROGRESS NOTE ADULT - PROBLEM SELECTOR PLAN 4
Pt with hx of HTN , BP on admission elevated to 190s. s/p Labetalol 10mg IV push. On losartan 25 at home.   - c/w losartan 25

## 2020-10-25 NOTE — PROGRESS NOTE ADULT - PROBLEM SELECTOR PLAN 2
She will need follow up with her oncologist upon return to [place]
Patient found to have large L sided pleural effusion and small R effusion on CT Chest Angio PE. Pleural effusion likely transudative given Light's criteria: fluid LDH and protein 95& 2.8. Serum LDH and protein: 283 & 6.8. Patient with elevated BNP and bedside POCUS showing decreased contractility.  - pulmonology consulted ; bedside thoracentesis with 1000mL drained.   - f/u fluid studies for cytology   - currently on RA in NAD  - f/u echo

## 2020-10-25 NOTE — PROGRESS NOTE ADULT - PROBLEM SELECTOR PLAN 3
Wean supplemental O2 as tolerated; pending echo
Patient follows with oncologist Buster Bui in [place] where she lives. Per patient last chemo was on October 3rd and next session is scheduled for October 29th, and she had been told that she was improving by her oncologist.  - outpatient follow-up with her oncologist

## 2020-10-25 NOTE — PROGRESS NOTE ADULT - SUBJECTIVE AND OBJECTIVE BOX
OVERNIGHT EVENTS: no acute events overnight.     SUBJECTIVE / INTERVAL HPI: Patient seen and examined at bedside.     VITAL SIGNS:  Vital Signs Last 24 Hrs  T(C): 36.7 (25 Oct 2020 12:52), Max: 36.9 (24 Oct 2020 20:32)  T(F): 98 (25 Oct 2020 12:52), Max: 98.4 (24 Oct 2020 20:32)  HR: 95 (25 Oct 2020 12:52) (85 - 95)  BP: 156/97 (25 Oct 2020 12:52) (117/74 - 156/97)  BP(mean): --  RR: 19 (25 Oct 2020 12:52) (18 - 19)  SpO2: 94% (25 Oct 2020 12:52) (94% - 100%)    PHYSICAL EXAM:    General: WDWN  HEENT: NC/AT; PERRL, anicteric sclera; MMM  Neck: supple  Cardiovascular: +S1/S2; RRR  Respiratory: CTA B/L; no W/R/R  Gastrointestinal: soft, NT/ND; +BSx4  Extremities: WWP; no edema, clubbing or cyanosis  Vascular: 2+ radial, DP/PT pulses B/L  Neurological: AAOx3; no focal deficits    MEDICATIONS:  MEDICATIONS  (STANDING):  enoxaparin Injectable 40 milliGRAM(s) SubCutaneous every 24 hours  fentaNYL   Patch  25 MICROgram(s)/Hr 1 Patch Transdermal every 72 hours  gabapentin 300 milliGRAM(s) Oral three times a day  losartan 25 milliGRAM(s) Oral daily    MEDICATIONS  (PRN):  HYDROmorphone  Injectable 0.5 milliGRAM(s) IV Push every 6 hours PRN Moderate Pain (4 - 6)  ondansetron Injectable 4 milliGRAM(s) IV Push every 12 hours PRN Nausea and/or Vomiting  tiZANidine 2 milliGRAM(s) Oral every 8 hours PRN Muscle Spasm      ALLERGIES:  Allergies    No Known Allergies    Intolerances        LABS:              CAPILLARY BLOOD GLUCOSE          RADIOLOGY & ADDITIONAL TESTS: Reviewed.    ASSESSMENT:    PLAN: OVERNIGHT EVENTS: no acute events overnight.     SUBJECTIVE / INTERVAL HPI: Patient seen and examined at bedside. She reproted resolution of her nasuea and vomiting. She denied abd pain, cp, sob. She was not utilizing the nasal cannula at the time of examination.     VITAL SIGNS:  Vital Signs Last 24 Hrs  T(C): 36.7 (25 Oct 2020 12:52), Max: 36.9 (24 Oct 2020 20:32)  T(F): 98 (25 Oct 2020 12:52), Max: 98.4 (24 Oct 2020 20:32)  HR: 95 (25 Oct 2020 12:52) (85 - 95)  BP: 156/97 (25 Oct 2020 12:52) (117/74 - 156/97)  BP(mean): --  RR: 19 (25 Oct 2020 12:52) (18 - 19)  SpO2: 94% (25 Oct 2020 12:52) (94% - 100%)    PHYSICAL EXAM:    General: WDWN  HEENT: NC/AT; PERRL, anicteric sclera; MMM  Neck: supple  Cardiovascular: +S1/S2; RRR  Respiratory: decreased breath sounds bilaterally  Gastrointestinal: soft, NT/ND; +BSx4  Extremities: WWP; no edema, clubbing or cyanosis  Vascular: 2+ radial, DP/PT pulses B/L  Neurological: AAOx3; no focal deficits    MEDICATIONS:  MEDICATIONS  (STANDING):  enoxaparin Injectable 40 milliGRAM(s) SubCutaneous every 24 hours  fentaNYL   Patch  25 MICROgram(s)/Hr 1 Patch Transdermal every 72 hours  gabapentin 300 milliGRAM(s) Oral three times a day  losartan 25 milliGRAM(s) Oral daily    MEDICATIONS  (PRN):  HYDROmorphone  Injectable 0.5 milliGRAM(s) IV Push every 6 hours PRN Moderate Pain (4 - 6)  ondansetron Injectable 4 milliGRAM(s) IV Push every 12 hours PRN Nausea and/or Vomiting  tiZANidine 2 milliGRAM(s) Oral every 8 hours PRN Muscle Spasm      ALLERGIES:  Allergies    No Known Allergies    Intolerances        LABS:              CAPILLARY BLOOD GLUCOSE          RADIOLOGY & ADDITIONAL TESTS: Reviewed.    ASSESSMENT:    PLAN: OVERNIGHT EVENTS: no acute events overnight.     SUBJECTIVE / INTERVAL HPI: Patient seen and examined at bedside. She reproted resolution of her nasuea and vomiting. She denied abd pain, cp, sob. She was not utilizing the nasal cannula at the time of examination. plan for tte tomorrow    VITAL SIGNS:  Vital Signs Last 24 Hrs  T(C): 36.7 (25 Oct 2020 12:52), Max: 36.9 (24 Oct 2020 20:32)  T(F): 98 (25 Oct 2020 12:52), Max: 98.4 (24 Oct 2020 20:32)  HR: 95 (25 Oct 2020 12:52) (85 - 95)  BP: 156/97 (25 Oct 2020 12:52) (117/74 - 156/97)  BP(mean): --  RR: 19 (25 Oct 2020 12:52) (18 - 19)  SpO2: 94% (25 Oct 2020 12:52) (94% - 100%)    PHYSICAL EXAM:    General: WDWN  HEENT: NC/AT; PERRL, anicteric sclera; MMM  Neck: supple  Cardiovascular: +S1/S2; RRR  Respiratory: decreased breath sounds bilaterally  Gastrointestinal: soft, NT/ND; +BSx4  Extremities: WWP; no edema, clubbing or cyanosis  Vascular: 2+ radial, DP/PT pulses B/L  Neurological: AAOx3; no focal deficits    MEDICATIONS:  MEDICATIONS  (STANDING):  enoxaparin Injectable 40 milliGRAM(s) SubCutaneous every 24 hours  fentaNYL   Patch  25 MICROgram(s)/Hr 1 Patch Transdermal every 72 hours  gabapentin 300 milliGRAM(s) Oral three times a day  losartan 25 milliGRAM(s) Oral daily    MEDICATIONS  (PRN):  HYDROmorphone  Injectable 0.5 milliGRAM(s) IV Push every 6 hours PRN Moderate Pain (4 - 6)  ondansetron Injectable 4 milliGRAM(s) IV Push every 12 hours PRN Nausea and/or Vomiting  tiZANidine 2 milliGRAM(s) Oral every 8 hours PRN Muscle Spasm      ALLERGIES:  Allergies    No Known Allergies    Intolerances        LABS:              CAPILLARY BLOOD GLUCOSE          RADIOLOGY & ADDITIONAL TESTS: Reviewed.    ASSESSMENT:    PLAN:

## 2020-10-25 NOTE — CHART NOTE - NSCHARTNOTEFT_GEN_A_CORE
CARDIOLOGY FELLOW BEDSIDE ECHO    Called by primary team to do bedside echo study to evaluate LVEF. Technically challenging study with limited views obtained of poor quality. Cannot make accurate LVEF assessment based on this study. Recommend repeating full, formal study in the morning.    --  Marcello Clark MD  Cardiology PGY5

## 2020-10-26 VITALS — OXYGEN SATURATION: 94 % | RESPIRATION RATE: 18 BRPM

## 2020-10-26 PROCEDURE — 93005 ELECTROCARDIOGRAM TRACING: CPT

## 2020-10-26 PROCEDURE — 89051 BODY FLUID CELL COUNT: CPT

## 2020-10-26 PROCEDURE — 83615 LACTATE (LD) (LDH) ENZYME: CPT

## 2020-10-26 PROCEDURE — 88341 IMHCHEM/IMCYTCHM EA ADD ANTB: CPT | Mod: 26,59

## 2020-10-26 PROCEDURE — 96375 TX/PRO/DX INJ NEW DRUG ADDON: CPT | Mod: XU

## 2020-10-26 PROCEDURE — 87075 CULTR BACTERIA EXCEPT BLOOD: CPT

## 2020-10-26 PROCEDURE — 82570 ASSAY OF URINE CREATININE: CPT

## 2020-10-26 PROCEDURE — 80053 COMPREHEN METABOLIC PANEL: CPT

## 2020-10-26 PROCEDURE — 88360 TUMOR IMMUNOHISTOCHEM/MANUAL: CPT

## 2020-10-26 PROCEDURE — 93306 TTE W/DOPPLER COMPLETE: CPT | Mod: 26

## 2020-10-26 PROCEDURE — 85379 FIBRIN DEGRADATION QUANT: CPT

## 2020-10-26 PROCEDURE — 87040 BLOOD CULTURE FOR BACTERIA: CPT

## 2020-10-26 PROCEDURE — 87102 FUNGUS ISOLATION CULTURE: CPT

## 2020-10-26 PROCEDURE — 99239 HOSP IP/OBS DSCHRG MGMT >30: CPT | Mod: GC

## 2020-10-26 PROCEDURE — 85730 THROMBOPLASTIN TIME PARTIAL: CPT

## 2020-10-26 PROCEDURE — 85025 COMPLETE CBC W/AUTO DIFF WBC: CPT

## 2020-10-26 PROCEDURE — 71275 CT ANGIOGRAPHY CHEST: CPT

## 2020-10-26 PROCEDURE — U0003: CPT

## 2020-10-26 PROCEDURE — 96368 THER/DIAG CONCURRENT INF: CPT | Mod: XU

## 2020-10-26 PROCEDURE — 82945 GLUCOSE OTHER FLUID: CPT

## 2020-10-26 PROCEDURE — 88360 TUMOR IMMUNOHISTOCHEM/MANUAL: CPT | Mod: 26

## 2020-10-26 PROCEDURE — 88112 CYTOPATH CELL ENHANCE TECH: CPT

## 2020-10-26 PROCEDURE — 84484 ASSAY OF TROPONIN QUANT: CPT

## 2020-10-26 PROCEDURE — 82728 ASSAY OF FERRITIN: CPT

## 2020-10-26 PROCEDURE — 81001 URINALYSIS AUTO W/SCOPE: CPT

## 2020-10-26 PROCEDURE — 93306 TTE W/DOPPLER COMPLETE: CPT

## 2020-10-26 PROCEDURE — 84145 PROCALCITONIN (PCT): CPT

## 2020-10-26 PROCEDURE — 83880 ASSAY OF NATRIURETIC PEPTIDE: CPT

## 2020-10-26 PROCEDURE — 88341 IMHCHEM/IMCYTCHM EA ADD ANTB: CPT

## 2020-10-26 PROCEDURE — 99232 SBSQ HOSP IP/OBS MODERATE 35: CPT | Mod: GC

## 2020-10-26 PROCEDURE — 84157 ASSAY OF PROTEIN OTHER: CPT

## 2020-10-26 PROCEDURE — 87205 SMEAR GRAM STAIN: CPT

## 2020-10-26 PROCEDURE — 87070 CULTURE OTHR SPECIMN AEROBIC: CPT

## 2020-10-26 PROCEDURE — 88112 CYTOPATH CELL ENHANCE TECH: CPT | Mod: 26

## 2020-10-26 PROCEDURE — 87116 MYCOBACTERIA CULTURE: CPT

## 2020-10-26 PROCEDURE — 82042 OTHER SOURCE ALBUMIN QUAN EA: CPT

## 2020-10-26 PROCEDURE — 96376 TX/PRO/DX INJ SAME DRUG ADON: CPT | Mod: XU

## 2020-10-26 PROCEDURE — 87206 SMEAR FLUORESCENT/ACID STAI: CPT

## 2020-10-26 PROCEDURE — 71045 X-RAY EXAM CHEST 1 VIEW: CPT

## 2020-10-26 PROCEDURE — 88305 TISSUE EXAM BY PATHOLOGIST: CPT | Mod: 26

## 2020-10-26 PROCEDURE — 83986 ASSAY PH BODY FLUID NOS: CPT

## 2020-10-26 PROCEDURE — 88305 TISSUE EXAM BY PATHOLOGIST: CPT

## 2020-10-26 PROCEDURE — 96365 THER/PROPH/DIAG IV INF INIT: CPT | Mod: XU

## 2020-10-26 PROCEDURE — 87086 URINE CULTURE/COLONY COUNT: CPT

## 2020-10-26 PROCEDURE — 84478 ASSAY OF TRIGLYCERIDES: CPT

## 2020-10-26 PROCEDURE — 36415 COLL VENOUS BLD VENIPUNCTURE: CPT

## 2020-10-26 PROCEDURE — 87015 SPECIMEN INFECT AGNT CONCNTJ: CPT

## 2020-10-26 PROCEDURE — 88342 IMHCHEM/IMCYTCHM 1ST ANTB: CPT | Mod: 26,59

## 2020-10-26 PROCEDURE — 99285 EMERGENCY DEPT VISIT HI MDM: CPT | Mod: 25

## 2020-10-26 PROCEDURE — 85610 PROTHROMBIN TIME: CPT

## 2020-10-26 PROCEDURE — 86140 C-REACTIVE PROTEIN: CPT

## 2020-10-26 RX ORDER — FUROSEMIDE 40 MG
1 TABLET ORAL
Qty: 30 | Refills: 0
Start: 2020-10-26 | End: 2020-11-24

## 2020-10-26 RX ORDER — HYDROMORPHONE HYDROCHLORIDE 2 MG/ML
0.5 INJECTION INTRAMUSCULAR; INTRAVENOUS; SUBCUTANEOUS ONCE
Refills: 0 | Status: DISCONTINUED | OUTPATIENT
Start: 2020-10-26 | End: 2020-10-26

## 2020-10-26 RX ADMIN — HYDROMORPHONE HYDROCHLORIDE 1 MILLIGRAM(S): 2 INJECTION INTRAMUSCULAR; INTRAVENOUS; SUBCUTANEOUS at 12:05

## 2020-10-26 RX ADMIN — HYDROMORPHONE HYDROCHLORIDE 1 MILLIGRAM(S): 2 INJECTION INTRAMUSCULAR; INTRAVENOUS; SUBCUTANEOUS at 11:41

## 2020-10-26 RX ADMIN — HYDROMORPHONE HYDROCHLORIDE 1 MILLIGRAM(S): 2 INJECTION INTRAMUSCULAR; INTRAVENOUS; SUBCUTANEOUS at 05:30

## 2020-10-26 RX ADMIN — GABAPENTIN 300 MILLIGRAM(S): 400 CAPSULE ORAL at 14:20

## 2020-10-26 RX ADMIN — HYDROMORPHONE HYDROCHLORIDE 0.5 MILLIGRAM(S): 2 INJECTION INTRAMUSCULAR; INTRAVENOUS; SUBCUTANEOUS at 15:23

## 2020-10-26 RX ADMIN — FENTANYL CITRATE 1 PATCH: 50 INJECTION INTRAVENOUS at 07:42

## 2020-10-26 RX ADMIN — HYDROMORPHONE HYDROCHLORIDE 1 MILLIGRAM(S): 2 INJECTION INTRAMUSCULAR; INTRAVENOUS; SUBCUTANEOUS at 06:30

## 2020-10-26 RX ADMIN — LOSARTAN POTASSIUM 25 MILLIGRAM(S): 100 TABLET, FILM COATED ORAL at 05:31

## 2020-10-26 RX ADMIN — HYDROMORPHONE HYDROCHLORIDE 0.5 MILLIGRAM(S): 2 INJECTION INTRAMUSCULAR; INTRAVENOUS; SUBCUTANEOUS at 15:00

## 2020-10-26 RX ADMIN — GABAPENTIN 300 MILLIGRAM(S): 400 CAPSULE ORAL at 05:31

## 2020-10-26 NOTE — PROGRESS NOTE ADULT - SUBJECTIVE AND OBJECTIVE BOX
PULMONARY CONSULT FOLLOW-UP NOTE    INTERVAL HISTORY:   Reviewed chart and overnight events; patient seen and examined at bedside.  Dry cough+.  no chest pain /pressure.   no SOB  SPo2 at bedside 97-98%, ambulatory sats 95%.       MEDICATIONS:  Pulmonary:    Antimicrobials:    Anticoagulants:  enoxaparin Injectable 40 milliGRAM(s) SubCutaneous every 24 hours    Cardiac:  losartan 25 milliGRAM(s) Oral daily      Allergies    No Known Allergies    Intolerances        Vital Signs Last 24 Hrs  T(C): 36.7 (26 Oct 2020 05:16), Max: 36.7 (25 Oct 2020 12:52)  T(F): 98 (26 Oct 2020 05:16), Max: 98 (25 Oct 2020 12:52)  HR: 90 (26 Oct 2020 05:16) (75 - 95)  BP: 144/88 (26 Oct 2020 05:16) (144/88 - 156/97)  BP(mean): --  RR: 18 (26 Oct 2020 05:16) (18 - 19)  SpO2: 95% (26 Oct 2020 05:16) (94% - 97%)        PHYSICAL EXAM:  Constitutional: WDWN  HEENT: NC/AT; PERRL, anicteric sclera; MMM  Neck: supple  Cardiovascular: +S1/S2, RRR  Respiratory: decreased breath sounds left lower lung zone.   Gastrointestinal: soft, NT/ND; +BSx4  Extremities: WWP; no edema, clubbing or cyanosis  Vascular: 2+ radial, DP/PT pulses B/L  Neurological: AAOx3; no focal deficits    LABS:  Complete Blood Count + Automated Diff (10.23.20 @ 11:24)    WBC Count: 6.84 K/uL    RBC Count: 4.39 M/uL    Hemoglobin: 14.1 g/dL    Hematocrit: 40.3 %    Mean Cell Volume: 91.8 fl    Mean Cell Hemoglobin: 32.1 pg    Mean Cell Hemoglobin Conc: 35.0 gm/dL    Red Cell Distrib Width: 14.1 %    Platelet Count - Automated: 230 K/uL    Auto Neutrophil #: 5.08 K/uL    Auto Lymphocyte #: 1.34 K/uL    Auto Monocyte #: 0.36 K/uL    Auto Eosinophil #: 0.01 K/uL    Auto Basophil #: 0.03 K/uL    Auto Neutrophil %: 74.3: Differential percentages must be correlated with absolute numbers for  clinical significance. %    Auto Lymphocyte %: 19.6 %    Auto Monocyte %: 5.3 %    Auto Eosinophil %: 0.1 %    Auto Basophil %: 0.4 %    Auto Immature Granulocyte %: 0.3 %    Nucleated RBC: 0 /100 WBCs    >      Cell Count, Body Fluid (10.23.20 @ 17:31)   Fluid Type: Pleural   Comment - Fluids: Pending pathology review   Body Fluid Appearance: Cloudy   BF Lymphocytes: 79 %   Mesothelial Cells - Body Fluid: 3 %   Monocyte/Macrophage Count - Body Fluid: 16 %   Fluid Segmented Granulocytes: 2 %   Fluid Source: Pleural   Eosinophil Count - Body Fluid: 1 %   Tube Type: Sterile   Color - Body Fluid: Red   Total Nucleated Cell Count, Body Fluid: 354: Specimen contains clot count is not accurate.   Peritoneal/Pleural/ Pericardial Body Fluid Types   Total Nucleated cells: <500/uL   Neutrophils: <25%   Synovial Body Fluid Type   Total Nucleated cells: <150/uL   Neutrophils: <25%   Lymphocytes: <75%   Monocytes/Macrophages:   Please note reference range updated effective Nov 26, 2019 /uL   Total RBC Count: 54126: Specimen contains clot count is not accurate. /uL     RADIOLOGY & ADDITIONAL STUDIES:  reviewed.

## 2020-10-26 NOTE — PROGRESS NOTE ADULT - ATTENDING COMMENTS
Agree. Feels better, looks well, safe to travel back to [place] to continue care. Dr Medrano spoke to her oncologist there. Fluid cyto is pending and EF is 37%.
Known metastatic breast cancer, with new left effusion, s/p thoracentesis. Tolerated procedure well. Pleural fluid is lymphocyte predominant.
Pt's nausea improved - no further vomiting. Denies any dyspnea or chest pain. Discussed options for follow-up regarding pleural effusion - Suspect likely malignancy related in setting of breast CA on chemotherapy. However noted possibility of chemotherapy induced cardiomyopathy in setting of BNP 2800 and transudative pattern on labs. Discussed merits of ruling out cardiomyopathy with TTE. Further options discussed including follow-up w pulmonary (Dr. Galaviz) prior to flight on 11/3 to evaluate for possible reaccumulation VS discharge w close follow-up w pt's established pulmonologist in [place] with consideration she would monitor for increased dyspnea or other sxs prior to her flight. Pt would prefer latter. Discussed w cardiology fellow on call regarding TTE pending dc.    DISPO: Home pending TTE. Team to fax records to outpatient pulm and heme-onc in [place].

## 2020-10-26 NOTE — PROGRESS NOTE ADULT - ASSESSMENT
46yo woman w/ hx of breastCA (right side dx ~2010, on chemo, s/p RT, with mets to R lung s/p removal), HTN who is in [facility] visiting from [place], and presenting with several days of worsening SOB and midsternal chest pressure; found with moderate-sized L-sided pleural effusion and now s/p diagnostic and therapeutic thoracentesis 10/24.     Left Pleural effusion:   Suspicious for malignancy given her history; s/p thoracentesis 10/23 and 1000cc fluid removal. Lymphocyte predominant and transudative by Light's criteria ([facility]).  *Cultures NGTD  *Cytology PENDING    Recommendations:  - f/u remaining pleural fluid studies, particularly cytology, though likely represents malignancy. her oncologist had stopped her chemotherapy early this month citing improvement in imaging ( CT / PET).   - She is 97% on room air, plans to fly back to [place] next week, should be able to do it without supplemental oxygen.

## 2020-10-28 LAB
CULTURE RESULTS: NO GROWTH — SIGNIFICANT CHANGE UP
CULTURE RESULTS: SIGNIFICANT CHANGE UP
CULTURE RESULTS: SIGNIFICANT CHANGE UP
NON-GYNECOLOGICAL CYTOLOGY STUDY: SIGNIFICANT CHANGE UP
SPECIMEN SOURCE: SIGNIFICANT CHANGE UP

## 2020-11-02 DIAGNOSIS — C79.51 SECONDARY MALIGNANT NEOPLASM OF BONE: ICD-10-CM

## 2020-11-02 DIAGNOSIS — Z90.11 ACQUIRED ABSENCE OF RIGHT BREAST AND NIPPLE: ICD-10-CM

## 2020-11-02 DIAGNOSIS — J90 PLEURAL EFFUSION, NOT ELSEWHERE CLASSIFIED: ICD-10-CM

## 2020-11-02 DIAGNOSIS — C78.00 SECONDARY MALIGNANT NEOPLASM OF UNSPECIFIED LUNG: ICD-10-CM

## 2020-11-02 DIAGNOSIS — I50.20 UNSPECIFIED SYSTOLIC (CONGESTIVE) HEART FAILURE: ICD-10-CM

## 2020-11-02 DIAGNOSIS — J96.01 ACUTE RESPIRATORY FAILURE WITH HYPOXIA: ICD-10-CM

## 2020-11-02 DIAGNOSIS — Z92.21 PERSONAL HISTORY OF ANTINEOPLASTIC CHEMOTHERAPY: ICD-10-CM

## 2020-11-02 DIAGNOSIS — Z79.52 LONG TERM (CURRENT) USE OF SYSTEMIC STEROIDS: ICD-10-CM

## 2020-11-02 DIAGNOSIS — I42.7 CARDIOMYOPATHY DUE TO DRUG AND EXTERNAL AGENT: ICD-10-CM

## 2020-11-02 DIAGNOSIS — I11.0 HYPERTENSIVE HEART DISEASE WITH HEART FAILURE: ICD-10-CM

## 2020-11-02 DIAGNOSIS — C78.7 SECONDARY MALIGNANT NEOPLASM OF LIVER AND INTRAHEPATIC BILE DUCT: ICD-10-CM

## 2020-11-21 LAB
CULTURE RESULTS: SIGNIFICANT CHANGE UP
SPECIMEN SOURCE: SIGNIFICANT CHANGE UP

## 2020-12-12 LAB
CULTURE RESULTS: SIGNIFICANT CHANGE UP
SPECIMEN SOURCE: SIGNIFICANT CHANGE UP

## 2022-12-31 NOTE — ED ADULT NURSE NOTE - OTHER COMPLAINTS
Patient also reports chest pain. Daughter reports patient recently traveled from [place] 11 days ago. Denies any fever.
DISCHARGE